# Patient Record
Sex: MALE | Race: WHITE | Employment: OTHER | URBAN - METROPOLITAN AREA
[De-identification: names, ages, dates, MRNs, and addresses within clinical notes are randomized per-mention and may not be internally consistent; named-entity substitution may affect disease eponyms.]

---

## 2017-07-28 ENCOUNTER — HISTORICAL (OUTPATIENT)
Dept: LAB | Facility: HOSPITAL | Age: 67
End: 2017-07-28

## 2017-07-28 LAB
ALBUMIN SERPL-MCNC: 3.7 GM/DL (ref 3.4–5)
ALBUMIN/GLOB SERPL: 1 RATIO
ALP SERPL-CCNC: 105 UNIT/L (ref 30–113)
ALT SERPL-CCNC: 34 UNIT/L (ref 10–45)
AST SERPL-CCNC: 22 UNIT/L (ref 15–37)
BILIRUB SERPL-MCNC: 0.3 MG/DL (ref 0.1–0.9)
BILIRUBIN DIRECT+TOT PNL SERPL-MCNC: 0.1 MG/DL (ref 0–0.3)
BILIRUBIN DIRECT+TOT PNL SERPL-MCNC: 0.2 MG/DL
BUN SERPL-MCNC: 24 MG/DL (ref 10–20)
CALCIUM SERPL-MCNC: 8.8 MG/DL (ref 8–10.5)
CHLORIDE SERPL-SCNC: 106 MMOL/L (ref 100–108)
CHOLEST SERPL-MCNC: 117 MG/DL (ref 140–200)
CHOLEST/HDLC SERPL: <3 MG/DL (ref 35–59)
CO2 SERPL-SCNC: 30 MMOL/L (ref 21–35)
CREAT SERPL-MCNC: 1.08 MG/DL (ref 0.7–1.3)
GLOBULIN SER-MCNC: 3.7 GM/DL
GLUCOSE SERPL-MCNC: 101 MG/DL (ref 75–116)
HDLC SERPL-MCNC: 62 MG/DL (ref 35–59)
LDLC SERPL CALC-MCNC: 50 MG/DL (ref 100–129)
POTASSIUM SERPL-SCNC: 4.9 MMOL/L (ref 3.6–5.2)
PROT SERPL-MCNC: 7.4 GM/DL (ref 6.4–8.2)
SODIUM SERPL-SCNC: 141 MMOL/L (ref 135–145)
TRIGL SERPL-MCNC: 24 MG/DL (ref 35–150)
VLDLC SERPL CALC-MCNC: 5 MG/DL

## 2018-09-21 ENCOUNTER — HISTORICAL (OUTPATIENT)
Dept: LAB | Facility: HOSPITAL | Age: 68
End: 2018-09-21

## 2018-09-21 LAB
ALBUMIN SERPL-MCNC: 3.8 GM/DL (ref 3.4–5)
ALBUMIN/GLOB SERPL: 0.9 RATIO
ALP SERPL-CCNC: 96 UNIT/L (ref 30–113)
ALT SERPL-CCNC: 32 UNIT/L (ref 10–45)
AST SERPL-CCNC: 23 UNIT/L (ref 15–37)
BILIRUB SERPL-MCNC: 0.5 MG/DL (ref 0.1–0.9)
BILIRUBIN DIRECT+TOT PNL SERPL-MCNC: 0.1 MG/DL (ref 0–0.3)
BILIRUBIN DIRECT+TOT PNL SERPL-MCNC: 0.4 MG/DL
BUN SERPL-MCNC: 37 MG/DL (ref 10–20)
CALCIUM SERPL-MCNC: 9.8 MG/DL (ref 8–10.5)
CHLORIDE SERPL-SCNC: 102 MMOL/L (ref 100–108)
CHOLEST SERPL-MCNC: 135 MG/DL (ref 140–200)
CHOLEST/HDLC SERPL: 2 MG/DL (ref 0–8)
CO2 SERPL-SCNC: 26 MMOL/L (ref 21–35)
CREAT SERPL-MCNC: 1.55 MG/DL (ref 0.7–1.3)
GLOBULIN SER-MCNC: 4.2 GM/DL
GLUCOSE SERPL-MCNC: 110 MG/DL (ref 75–116)
HDLC SERPL-MCNC: 64 MG/DL (ref 35–59)
LDLC SERPL CALC-MCNC: 60 MG/DL (ref 100–129)
POTASSIUM SERPL-SCNC: 4.3 MMOL/L (ref 3.6–5.2)
PROT SERPL-MCNC: 8 GM/DL (ref 6.4–8.2)
PSA SERPL-MCNC: 13.23 NG/ML (ref 0–4)
SODIUM SERPL-SCNC: 138 MMOL/L (ref 135–145)
TRIGL SERPL-MCNC: 100 MG/DL (ref 35–150)
VLDLC SERPL CALC-MCNC: 20 MG/DL

## 2018-10-02 ENCOUNTER — HISTORICAL (OUTPATIENT)
Dept: LAB | Facility: HOSPITAL | Age: 68
End: 2018-10-02

## 2018-10-02 LAB — HCV AB SERPL QL IA: NEGATIVE

## 2018-10-08 ENCOUNTER — HISTORICAL (OUTPATIENT)
Dept: SURGERY | Facility: HOSPITAL | Age: 68
End: 2018-10-08

## 2018-10-08 LAB
ABS NEUT (OLG): 4.4 X10(3)/MCL (ref 1.5–6.9)
ERYTHROCYTE [DISTWIDTH] IN BLOOD BY AUTOMATED COUNT: 13.4 % (ref 11.5–17)
HCT VFR BLD AUTO: 41 % (ref 42–52)
HGB BLD-MCNC: 13.5 GM/DL (ref 14–18)
MCH RBC QN AUTO: 31 PG (ref 27–34)
MCHC RBC AUTO-ENTMCNC: 33 GM/DL (ref 31–36)
MCV RBC AUTO: 93 FL (ref 80–99)
PLATELET # BLD AUTO: 231 X10(3)/MCL (ref 140–400)
PMV BLD AUTO: 9.7 FL (ref 6.8–10)
RBC # BLD AUTO: 4.4 X10(6)/MCL (ref 4.7–6.1)
WBC # SPEC AUTO: 7.5 X10(3)/MCL (ref 4.5–11.5)

## 2018-10-09 ENCOUNTER — HISTORICAL (OUTPATIENT)
Dept: ANESTHESIOLOGY | Facility: HOSPITAL | Age: 68
End: 2018-10-09

## 2018-10-12 ENCOUNTER — HISTORICAL (OUTPATIENT)
Dept: RADIOLOGY | Facility: HOSPITAL | Age: 68
End: 2018-10-12

## 2018-11-29 ENCOUNTER — HISTORICAL (OUTPATIENT)
Dept: RADIOLOGY | Facility: HOSPITAL | Age: 68
End: 2018-11-29

## 2019-04-30 ENCOUNTER — HISTORICAL (OUTPATIENT)
Dept: RADIOLOGY | Facility: HOSPITAL | Age: 69
End: 2019-04-30

## 2019-06-04 PROBLEM — G93.89 BRAIN MASS: Status: ACTIVE | Noted: 2019-06-04

## 2019-06-04 PROBLEM — C61 PROSTATE CANCER: Status: ACTIVE | Noted: 2019-06-04

## 2019-08-06 ENCOUNTER — HISTORICAL (OUTPATIENT)
Dept: RADIOLOGY | Facility: HOSPITAL | Age: 69
End: 2019-08-06

## 2019-08-29 ENCOUNTER — HISTORICAL (OUTPATIENT)
Dept: ADMINISTRATIVE | Facility: HOSPITAL | Age: 69
End: 2019-08-29

## 2019-09-17 ENCOUNTER — HISTORICAL (OUTPATIENT)
Dept: ADMINISTRATIVE | Facility: HOSPITAL | Age: 69
End: 2019-09-17

## 2019-09-25 ENCOUNTER — HISTORICAL (OUTPATIENT)
Dept: ADMINISTRATIVE | Facility: HOSPITAL | Age: 69
End: 2019-09-25

## 2019-10-03 ENCOUNTER — HISTORICAL (OUTPATIENT)
Dept: LAB | Facility: HOSPITAL | Age: 69
End: 2019-10-03

## 2019-10-03 LAB
ABS NEUT (OLG): 3.4 X10(3)/MCL (ref 1.5–6.9)
ALBUMIN SERPL-MCNC: 3.6 GM/DL (ref 3.4–5)
ALBUMIN/GLOB SERPL: 1 RATIO
ALP SERPL-CCNC: 102 UNIT/L (ref 30–113)
ALT SERPL-CCNC: 36 UNIT/L (ref 10–45)
AST SERPL-CCNC: 18 UNIT/L (ref 15–37)
BASOPHILS # BLD AUTO: 0 X10(3)/MCL (ref 0–0.1)
BASOPHILS NFR BLD AUTO: 1 % (ref 0–1)
BILIRUB SERPL-MCNC: 0.3 MG/DL (ref 0.1–0.9)
BILIRUBIN DIRECT+TOT PNL SERPL-MCNC: 0.1 MG/DL (ref 0–0.3)
BILIRUBIN DIRECT+TOT PNL SERPL-MCNC: 0.2 MG/DL
BUN SERPL-MCNC: 18 MG/DL (ref 10–20)
CALCIUM SERPL-MCNC: 9.2 MG/DL (ref 8–10.5)
CHLORIDE SERPL-SCNC: 103 MMOL/L (ref 100–108)
CHOLEST SERPL-MCNC: 133 MG/DL (ref 140–200)
CHOLEST/HDLC SERPL: 2 MG/DL (ref 0–8)
CO2 SERPL-SCNC: 29 MMOL/L (ref 21–35)
CREAT SERPL-MCNC: 1.19 MG/DL (ref 0.7–1.3)
EOSINOPHIL # BLD AUTO: 0.2 X10(3)/MCL (ref 0–0.6)
EOSINOPHIL NFR BLD AUTO: 4 % (ref 0–5)
ERYTHROCYTE [DISTWIDTH] IN BLOOD BY AUTOMATED COUNT: 13.8 % (ref 11.5–17)
GLOBULIN SER-MCNC: 3.7 GM/DL
GLUCOSE SERPL-MCNC: 125 MG/DL (ref 75–116)
HCT VFR BLD AUTO: 36.2 % (ref 42–52)
HDLC SERPL-MCNC: 58 MG/DL (ref 35–59)
HGB BLD-MCNC: 11.9 GM/DL (ref 14–18)
IMM GRANULOCYTES # BLD AUTO: 0.01 10*3/UL (ref 0–0.02)
IMM GRANULOCYTES NFR BLD AUTO: 0.2 % (ref 0–0.43)
LDLC SERPL CALC-MCNC: 56 MG/DL (ref 100–129)
LYMPHOCYTES # BLD AUTO: 0.6 X10(3)/MCL (ref 0.5–4.1)
LYMPHOCYTES NFR BLD AUTO: 14 % (ref 15–40)
MCH RBC QN AUTO: 32 PG (ref 27–34)
MCHC RBC AUTO-ENTMCNC: 33 GM/DL (ref 31–36)
MCV RBC AUTO: 97 FL (ref 80–99)
MONOCYTES # BLD AUTO: 0.5 X10(3)/MCL (ref 0–1.1)
MONOCYTES NFR BLD AUTO: 10 % (ref 4–12)
NEUTROPHILS # BLD AUTO: 3.4 X10(3)/MCL (ref 1.5–6.9)
NEUTROPHILS NFR BLD AUTO: 71 % (ref 43–75)
PLATELET # BLD AUTO: 239 X10(3)/MCL (ref 140–400)
PMV BLD AUTO: 8.9 FL (ref 6.8–10)
POTASSIUM SERPL-SCNC: 4.6 MMOL/L (ref 3.6–5.2)
PROT SERPL-MCNC: 7.3 GM/DL (ref 6.4–8.2)
PSA SERPL-MCNC: <0.13 NG/ML (ref 0–4)
RBC # BLD AUTO: 3.72 X10(6)/MCL (ref 4.7–6.1)
SODIUM SERPL-SCNC: 139 MMOL/L (ref 135–145)
TRIGL SERPL-MCNC: 108 MG/DL (ref 35–150)
TSH SERPL-ACNC: 1.44 MIU/ML (ref 0.36–3.74)
VLDLC SERPL CALC-MCNC: 22 MG/DL
WBC # SPEC AUTO: 4.7 X10(3)/MCL (ref 4.5–11.5)

## 2019-10-15 ENCOUNTER — HISTORICAL (OUTPATIENT)
Dept: RADIOLOGY | Facility: HOSPITAL | Age: 69
End: 2019-10-15

## 2019-11-04 ENCOUNTER — HISTORICAL (OUTPATIENT)
Dept: RADIOLOGY | Facility: HOSPITAL | Age: 69
End: 2019-11-04

## 2019-11-04 LAB — POC CREATININE: 1.7 MG/DL (ref 0.6–1.3)

## 2019-11-14 ENCOUNTER — HISTORICAL (OUTPATIENT)
Dept: LAB | Facility: HOSPITAL | Age: 69
End: 2019-11-14

## 2019-11-14 LAB
BUN SERPL-MCNC: 17 MG/DL (ref 10–20)
CALCIUM SERPL-MCNC: 8.6 MG/DL (ref 8–10.5)
CHLORIDE SERPL-SCNC: 106 MMOL/L (ref 100–108)
CO2 SERPL-SCNC: 32 MMOL/L (ref 21–35)
CREAT SERPL-MCNC: 1.14 MG/DL (ref 0.7–1.3)
GLUCOSE SERPL-MCNC: 154 MG/DL (ref 75–116)
POTASSIUM SERPL-SCNC: 4.3 MMOL/L (ref 3.6–5.2)
SODIUM SERPL-SCNC: 142 MMOL/L (ref 135–145)

## 2019-11-27 ENCOUNTER — HISTORICAL (OUTPATIENT)
Dept: RADIOLOGY | Facility: HOSPITAL | Age: 69
End: 2019-11-27

## 2019-11-27 LAB — POC CREATININE: 1.2 MG/DL (ref 0.6–1.3)

## 2019-12-06 ENCOUNTER — HISTORICAL (OUTPATIENT)
Dept: LAB | Facility: HOSPITAL | Age: 69
End: 2019-12-06

## 2019-12-06 LAB
ABS NEUT (OLG): 5.3 X10(3)/MCL (ref 1.5–6.9)
ALBUMIN SERPL-MCNC: 3.8 GM/DL (ref 3.4–5)
ALBUMIN/GLOB SERPL: 1 RATIO
ALP SERPL-CCNC: 125 UNIT/L (ref 30–113)
ALT SERPL-CCNC: 59 UNIT/L (ref 10–45)
AST SERPL-CCNC: 24 UNIT/L (ref 15–37)
BASOPHILS # BLD AUTO: 0 X10(3)/MCL (ref 0–0.1)
BASOPHILS NFR BLD AUTO: 1 % (ref 0–1)
BILIRUB SERPL-MCNC: 0.3 MG/DL (ref 0.1–0.9)
BILIRUBIN DIRECT+TOT PNL SERPL-MCNC: 0.1 MG/DL (ref 0–0.3)
BILIRUBIN DIRECT+TOT PNL SERPL-MCNC: 0.2 MG/DL
BUN SERPL-MCNC: 25 MG/DL (ref 10–20)
CALCIUM SERPL-MCNC: 9.3 MG/DL (ref 8–10.5)
CHLORIDE SERPL-SCNC: 101 MMOL/L (ref 100–108)
CO2 SERPL-SCNC: 29 MMOL/L (ref 21–35)
CREAT SERPL-MCNC: 1.63 MG/DL (ref 0.7–1.3)
EOSINOPHIL # BLD AUTO: 0.2 X10(3)/MCL (ref 0–0.6)
EOSINOPHIL NFR BLD AUTO: 2 % (ref 0–5)
ERYTHROCYTE [DISTWIDTH] IN BLOOD BY AUTOMATED COUNT: 12.8 % (ref 11.5–17)
GLOBULIN SER-MCNC: 4 GM/DL
GLUCOSE SERPL-MCNC: 130 MG/DL (ref 75–116)
HCT VFR BLD AUTO: 43.5 % (ref 42–52)
HGB BLD-MCNC: 14.3 GM/DL (ref 14–18)
IMM GRANULOCYTES # BLD AUTO: 0.03 10*3/UL (ref 0–0.02)
IMM GRANULOCYTES NFR BLD AUTO: 0.4 % (ref 0–0.43)
LYMPHOCYTES # BLD AUTO: 0.8 X10(3)/MCL (ref 0.5–4.1)
LYMPHOCYTES NFR BLD AUTO: 12 % (ref 15–40)
MCH RBC QN AUTO: 31 PG (ref 27–34)
MCHC RBC AUTO-ENTMCNC: 33 GM/DL (ref 31–36)
MCV RBC AUTO: 94 FL (ref 80–99)
MONOCYTES # BLD AUTO: 0.7 X10(3)/MCL (ref 0–1.1)
MONOCYTES NFR BLD AUTO: 10 % (ref 4–12)
NEUTROPHILS # BLD AUTO: 5.3 X10(3)/MCL (ref 1.5–6.9)
NEUTROPHILS NFR BLD AUTO: 75 % (ref 43–75)
PLATELET # BLD AUTO: 271 X10(3)/MCL (ref 140–400)
PMV BLD AUTO: 9.9 FL (ref 6.8–10)
POTASSIUM SERPL-SCNC: 4.5 MMOL/L (ref 3.6–5.2)
PROT SERPL-MCNC: 7.8 GM/DL (ref 6.4–8.2)
RBC # BLD AUTO: 4.64 X10(6)/MCL (ref 4.7–6.1)
SODIUM SERPL-SCNC: 137 MMOL/L (ref 135–145)
TSH SERPL-ACNC: 3.51 MIU/ML (ref 0.36–3.74)
WBC # SPEC AUTO: 7 X10(3)/MCL (ref 4.5–11.5)

## 2019-12-10 ENCOUNTER — HISTORICAL (OUTPATIENT)
Dept: RADIOLOGY | Facility: HOSPITAL | Age: 69
End: 2019-12-10

## 2020-02-06 PROBLEM — Z98.890 S/P CRANIOTOMY: Status: ACTIVE | Noted: 2020-02-06

## 2020-03-04 ENCOUNTER — HISTORICAL (OUTPATIENT)
Dept: LAB | Facility: HOSPITAL | Age: 70
End: 2020-03-04

## 2020-03-04 LAB
ABS NEUT (OLG): 4 X10(3)/MCL (ref 1.5–6.9)
BASOPHILS # BLD AUTO: 0 X10(3)/MCL (ref 0–0.1)
BASOPHILS NFR BLD AUTO: 1 % (ref 0–1)
EOSINOPHIL # BLD AUTO: 0.1 X10(3)/MCL (ref 0–0.6)
EOSINOPHIL NFR BLD AUTO: 2 % (ref 0–5)
ERYTHROCYTE [DISTWIDTH] IN BLOOD BY AUTOMATED COUNT: 13.8 % (ref 11.5–17)
HCT VFR BLD AUTO: 39.8 % (ref 42–52)
HGB BLD-MCNC: 13 GM/DL (ref 14–18)
IMM GRANULOCYTES # BLD AUTO: 0.01 10*3/UL (ref 0–0.02)
IMM GRANULOCYTES NFR BLD AUTO: 0.2 % (ref 0–0.43)
LYMPHOCYTES # BLD AUTO: 0.8 X10(3)/MCL (ref 0.5–4.1)
LYMPHOCYTES NFR BLD AUTO: 14 % (ref 15–40)
MCH RBC QN AUTO: 30 PG (ref 27–34)
MCHC RBC AUTO-ENTMCNC: 33 GM/DL (ref 31–36)
MCV RBC AUTO: 93 FL (ref 80–99)
MONOCYTES # BLD AUTO: 0.7 X10(3)/MCL (ref 0–1.1)
MONOCYTES NFR BLD AUTO: 12 % (ref 4–12)
NEUTROPHILS # BLD AUTO: 4 X10(3)/MCL (ref 1.5–6.9)
NEUTROPHILS NFR BLD AUTO: 72 % (ref 43–75)
PLATELET # BLD AUTO: 259 X10(3)/MCL (ref 140–400)
PMV BLD AUTO: 9.4 FL (ref 6.8–10)
RBC # BLD AUTO: 4.26 X10(6)/MCL (ref 4.7–6.1)
WBC # SPEC AUTO: 5.5 X10(3)/MCL (ref 4.5–11.5)

## 2020-05-05 ENCOUNTER — HISTORICAL (OUTPATIENT)
Dept: RADIOLOGY | Facility: HOSPITAL | Age: 70
End: 2020-05-05

## 2020-05-05 LAB
BUN SERPL-MCNC: 23 MG/DL (ref 7–18)
CALCIUM SERPL-MCNC: 9 MG/DL (ref 8.5–10.1)
CHLORIDE SERPL-SCNC: 108 MMOL/L (ref 98–107)
CO2 SERPL-SCNC: 26 MMOL/L (ref 21–32)
CREAT SERPL-MCNC: 1.2 MG/DL (ref 0.6–1.3)
CREAT/UREA NIT SERPL: 19
GLUCOSE SERPL-MCNC: 140 MG/DL (ref 74–106)
POTASSIUM SERPL-SCNC: 4.1 MMOL/L (ref 3.5–5.1)
SODIUM SERPL-SCNC: 138 MMOL/L (ref 136–145)

## 2020-06-23 ENCOUNTER — HISTORICAL (OUTPATIENT)
Dept: LAB | Facility: HOSPITAL | Age: 70
End: 2020-06-23

## 2020-06-24 ENCOUNTER — HISTORICAL (OUTPATIENT)
Dept: LAB | Facility: HOSPITAL | Age: 70
End: 2020-06-24

## 2020-06-24 LAB
ALBUMIN SERPL-MCNC: 3.4 GM/DL (ref 3.4–4.8)
ALBUMIN/GLOB SERPL: 1 RATIO (ref 1.1–2)
ALP SERPL-CCNC: 112 UNIT/L (ref 40–150)
ALT SERPL-CCNC: 43 UNIT/L (ref 0–55)
AST SERPL-CCNC: 25 UNIT/L (ref 5–34)
BILIRUB SERPL-MCNC: 0.5 MG/DL
BILIRUBIN DIRECT+TOT PNL SERPL-MCNC: 0.2 MG/DL (ref 0–0.5)
BILIRUBIN DIRECT+TOT PNL SERPL-MCNC: 0.3 MG/DL (ref 0–0.8)
BUN SERPL-MCNC: 18 MG/DL (ref 8.4–25.7)
CALCIUM SERPL-MCNC: 9.6 MG/DL (ref 8.8–10)
CHLORIDE SERPL-SCNC: 104 MMOL/L (ref 98–107)
CHOLEST SERPL-MCNC: 174 MG/DL
CHOLEST/HDLC SERPL: 3 {RATIO} (ref 0–5)
CO2 SERPL-SCNC: 28 MMOL/L (ref 23–31)
CREAT SERPL-MCNC: 1.05 MG/DL (ref 0.73–1.18)
GLOBULIN SER-MCNC: 3.5 GM/DL (ref 2.4–3.5)
GLUCOSE SERPL-MCNC: 127 MG/DL (ref 82–115)
HDLC SERPL-MCNC: 50 MG/DL (ref 35–60)
LDLC SERPL CALC-MCNC: 99 MG/DL (ref 50–140)
POTASSIUM SERPL-SCNC: 4.1 MMOL/L (ref 3.5–5.1)
PROT SERPL-MCNC: 6.9 GM/DL (ref 5.8–7.6)
SODIUM SERPL-SCNC: 140 MMOL/L (ref 136–145)
TRIGL SERPL-MCNC: 125 MG/DL (ref 34–140)
TSH SERPL-ACNC: 1.03 UIU/ML (ref 0.35–4.94)
VLDLC SERPL CALC-MCNC: 25 MG/DL

## 2020-08-24 LAB
ABS NEUT (OLG): 3.97 X10(3)/MCL (ref 2.1–9.2)
BASOPHILS # BLD AUTO: 0.03 X10(3)/MCL (ref 0–0.2)
BASOPHILS NFR BLD AUTO: 0.5 % (ref 0–0.9)
BUN SERPL-MCNC: 19 MG/DL (ref 8.4–25.7)
CALCIUM SERPL-MCNC: 9.6 MG/DL (ref 8.8–10)
CHLORIDE SERPL-SCNC: 104 MMOL/L (ref 98–107)
CO2 SERPL-SCNC: 26 MMOL/L (ref 23–31)
CREAT SERPL-MCNC: 1.17 MG/DL (ref 0.72–1.25)
CREAT/UREA NIT SERPL: 16
EOSINOPHIL # BLD AUTO: 0.12 X10(3)/MCL (ref 0–0.9)
EOSINOPHIL NFR BLD AUTO: 2.2 % (ref 0–6.5)
ERYTHROCYTE [DISTWIDTH] IN BLOOD BY AUTOMATED COUNT: 13.6 % (ref 11.5–17)
GLUCOSE SERPL-MCNC: 177 MG/DL (ref 82–115)
HCT VFR BLD AUTO: 38.7 % (ref 42–52)
HGB BLD-MCNC: 13 GM/DL (ref 14–18)
IMM GRANULOCYTES # BLD AUTO: 0.03 10*3/UL (ref 0–0.02)
IMM GRANULOCYTES NFR BLD AUTO: 0.5 % (ref 0–0.43)
LYMPHOCYTES # BLD AUTO: 0.68 X10(3)/MCL (ref 0.6–4.6)
LYMPHOCYTES NFR BLD AUTO: 12.4 % (ref 16.2–38.3)
MCH RBC QN AUTO: 30.8 PG (ref 27–31)
MCHC RBC AUTO-ENTMCNC: 33.6 GM/DL (ref 33–36)
MCV RBC AUTO: 91.7 FL (ref 80–94)
MONOCYTES # BLD AUTO: 0.66 X10(3)/MCL (ref 0.1–1.3)
MONOCYTES NFR BLD AUTO: 12 % (ref 4.7–11.3)
NEUTROPHILS # BLD AUTO: 3.97 X10(3)/MCL (ref 2.1–9.2)
NEUTROPHILS NFR BLD AUTO: 72.4 % (ref 49.1–73.4)
NRBC BLD AUTO-RTO: 0 % (ref 0–0.2)
PLATELET # BLD AUTO: 224 X10(3)/MCL (ref 130–400)
PMV BLD AUTO: 9.8 FL (ref 7.4–10.4)
POTASSIUM SERPL-SCNC: 4.2 MMOL/L (ref 3.5–5.1)
RBC # BLD AUTO: 4.22 X10(6)/MCL (ref 4.7–6.1)
SARS-COV-2 AG RESP QL IA.RAPID: NEGATIVE
SODIUM SERPL-SCNC: 141 MMOL/L (ref 136–145)
WBC # SPEC AUTO: 5.5 X10(3)/MCL (ref 4.5–11.5)

## 2020-08-25 ENCOUNTER — HISTORICAL (OUTPATIENT)
Dept: SURGERY | Facility: HOSPITAL | Age: 70
End: 2020-08-25

## 2020-09-03 ENCOUNTER — HISTORICAL (OUTPATIENT)
Dept: ADMINISTRATIVE | Facility: HOSPITAL | Age: 70
End: 2020-09-03

## 2020-09-04 ENCOUNTER — HISTORICAL (OUTPATIENT)
Dept: SURGERY | Facility: HOSPITAL | Age: 70
End: 2020-09-04

## 2020-09-04 LAB — GRAM STN SPEC: NORMAL

## 2020-09-07 LAB
FINAL CULTURE: NO GROWTH
FINAL CULTURE: NORMAL

## 2020-09-17 ENCOUNTER — HISTORICAL (OUTPATIENT)
Dept: ADMINISTRATIVE | Facility: HOSPITAL | Age: 70
End: 2020-09-17

## 2020-10-05 LAB — FINAL CULTURE: NORMAL

## 2020-10-16 ENCOUNTER — HISTORICAL (OUTPATIENT)
Dept: LAB | Facility: HOSPITAL | Age: 70
End: 2020-10-16

## 2020-10-16 LAB
ALBUMIN SERPL-MCNC: 3.6 GM/DL (ref 3.4–4.8)
ALBUMIN/GLOB SERPL: 0.9 RATIO (ref 1.1–2)
ALP SERPL-CCNC: 125 UNIT/L (ref 40–150)
ALT SERPL-CCNC: 22 UNIT/L (ref 0–55)
AST SERPL-CCNC: 21 UNIT/L (ref 5–34)
BILIRUB SERPL-MCNC: 0.4 MG/DL
BILIRUBIN DIRECT+TOT PNL SERPL-MCNC: 0.2 MG/DL (ref 0–0.5)
BILIRUBIN DIRECT+TOT PNL SERPL-MCNC: 0.2 MG/DL (ref 0–0.8)
BUN SERPL-MCNC: 19 MG/DL (ref 8.4–25.7)
CALCIUM SERPL-MCNC: 9.6 MG/DL (ref 8.8–10)
CHLORIDE SERPL-SCNC: 102 MMOL/L (ref 98–107)
CHOLEST SERPL-MCNC: 127 MG/DL
CHOLEST/HDLC SERPL: 2 {RATIO} (ref 0–5)
CO2 SERPL-SCNC: 29 MMOL/L (ref 23–31)
CREAT SERPL-MCNC: 0.98 MG/DL (ref 0.73–1.18)
GLOBULIN SER-MCNC: 4 GM/DL (ref 2.4–3.5)
GLUCOSE SERPL-MCNC: 123 MG/DL (ref 82–115)
HDLC SERPL-MCNC: 56 MG/DL (ref 35–60)
LDLC SERPL CALC-MCNC: 49 MG/DL (ref 50–140)
POTASSIUM SERPL-SCNC: 4.1 MMOL/L (ref 3.5–5.1)
PROT SERPL-MCNC: 7.6 GM/DL (ref 5.8–7.6)
SODIUM SERPL-SCNC: 142 MMOL/L (ref 136–145)
TRIGL SERPL-MCNC: 108 MG/DL (ref 34–140)
TSH SERPL-ACNC: 0.98 UIU/ML (ref 0.35–4.94)
VLDLC SERPL CALC-MCNC: 22 MG/DL

## 2020-10-19 ENCOUNTER — HISTORICAL (OUTPATIENT)
Dept: ADMINISTRATIVE | Facility: HOSPITAL | Age: 70
End: 2020-10-19

## 2020-11-24 ENCOUNTER — HISTORICAL (OUTPATIENT)
Dept: LAB | Facility: HOSPITAL | Age: 70
End: 2020-11-24

## 2020-11-24 LAB
EST. AVERAGE GLUCOSE BLD GHB EST-MCNC: 128.4 MG/DL
HBA1C MFR BLD: 6.1 %

## 2020-12-11 ENCOUNTER — HISTORICAL (OUTPATIENT)
Dept: RADIOLOGY | Facility: HOSPITAL | Age: 70
End: 2020-12-11

## 2020-12-11 LAB — CREAT SERPL-MCNC: 1.18 MG/DL (ref 0.73–1.18)

## 2021-01-14 ENCOUNTER — HISTORICAL (OUTPATIENT)
Dept: ADMINISTRATIVE | Facility: HOSPITAL | Age: 71
End: 2021-01-14

## 2021-02-26 ENCOUNTER — HISTORICAL (OUTPATIENT)
Dept: LAB | Facility: HOSPITAL | Age: 71
End: 2021-02-26

## 2021-02-26 LAB
PSA SERPL-MCNC: 0.12 NG/ML
TESTOST SERPL-MCNC: 251.18 NG/DL (ref 220.91–715.81)

## 2021-07-02 ENCOUNTER — HISTORICAL (OUTPATIENT)
Dept: ADMINISTRATIVE | Facility: HOSPITAL | Age: 71
End: 2021-07-02

## 2022-02-15 ENCOUNTER — HISTORICAL (OUTPATIENT)
Dept: ADMINISTRATIVE | Facility: HOSPITAL | Age: 72
End: 2022-02-15

## 2022-04-11 ENCOUNTER — HISTORICAL (OUTPATIENT)
Dept: ADMINISTRATIVE | Facility: HOSPITAL | Age: 72
End: 2022-04-11
Payer: MEDICARE

## 2022-04-24 VITALS
DIASTOLIC BLOOD PRESSURE: 73 MMHG | SYSTOLIC BLOOD PRESSURE: 104 MMHG | WEIGHT: 216.06 LBS | BODY MASS INDEX: 32.74 KG/M2 | OXYGEN SATURATION: 95 % | HEIGHT: 68 IN

## 2022-04-30 NOTE — OP NOTE
DATE OF SURGERY:    09/04/2020    SURGEON:  Nick Portillo MD    SERVICE:  Orthopedics    PREOPERATIVE DIAGNOSIS:  Hardware failure, re-fracture, left clavicle fracture; hematoma.    POSTOPERATIVE DIAGNOSIS:  Hardware failure, re-fracture, left clavicle fracture; hematoma.    PROCEDURE:    1. Evacuation of hematoma and washout, left clavicle area.  2. Hardware removal, left clavicle fracture.  3. Open reduction, internal fixation left comminuted left clavicle fracture.    ANESTHESIA:  LMA.    IV FLUIDS:  Per Anesthesia.    ESTIMATED BLOOD LOSS:  Less than 50 cc.    COUNTS:  Correct.    COMPLICATIONS:  None.    IMPLANTS:  Synthes recon plate, 2.4 lag screws.    DISPOSITION:  Stable to PACU.    INDICATIONS FOR PROCEDURE:  Mr. Poe is a 69-year-old male with continued pain, loss of motion of his left shoulder.  Patient had a re-fracture of his left clavicle fracture.  The risks, benefits, and alternatives were discussed with the patient and patient's family in detail.  All questions were answered.  Informed consent was obtained.    PROCEDURE IN DETAIL:  The patient was found in preoperative holding by Anesthesia and found fit for surgery. He was taken to the operating room and placed on the operating table in supine position.  All bony prominences were well padded.  Time-out was called to identify correct patient, correct procedure, and correct site, and all were in agreement.  The patient underwent LMA anesthesia without complications.  He was then prepped and draped in the normal sterile fashion leaving the left upper extremity exposed for surgery.  He was in modified beach chair position.  He received preoperative antibiotics.  Next, his previous incision was then opened.  The hematoma was evacuated.  Cultures were taken.  Copious irrigation was used to wash the wound.  His incision was also brought medially another 3 cm.  Next, with careful and meticulous dissection, the fracture site was debrided of all  soft tissue debris.  It was unable to be lagged primarily due to the severe comminution.  His plate and screws from previous surgery was then removed.  He did have a new fracture line in the most medial fracture of vertical orientation which allowed the screws to cut out.  Next, with the use of point-to-point clamps, the fracture was then anatomically reduced and one 2.4 screw was then lagged across the main fracture fragment fixing the new fracture.  After this was done, an 8-hole recon plate was then chosen.  Three cortical screws were placed medial and lateral to the fracture including a locking screw.  After this was done, this was curved to his clavicle.  After this was done, multiple views with the big C-arm found the fracture well reduced and the hardware in appropriate position.  His fracture was stressed in surgery and found no instability appreciated.  Copious irrigation was used to wash the wound.  Hemostasis was achieved.  The fascia was closed with 0 Vicryl, subcutaneous tissue closed with 2-0 Vicryl.  Skin was closed with 3-0 nylon suture in standard fashion.  Xeroform, 4 x 4's, soft tissue dressing and sling was placed on left upper extremity.  He was awoken by Anesthesia and brought to PACU in stable condition.        ______________________________  MD JUAN JOSE Snyder/MIRIAM  DD:  09/04/2020  Time:  12:39PM  DT:  09/04/2020  Time:  01:01PM  Job #:  [id number]

## 2022-04-30 NOTE — OP NOTE
Patient:   Reggie Poe             MRN: [id number]            FIN: [id number]               Age:   68 years     Sex:  Male     :  1950   Associated Diagnoses:   Encounter for screening colonoscopy; Polyp of colon   Author:   Chris MCCALLUM, Rachael REESE      Operative Note   Operative Information   Date/ Time:  10/9/2018 09:34:00.     Procedures Performed: Procedure Code   Colonoscopy, flexible; with removal of tumor(s), polyp(s), or other lesion(s) by snare technique (37367).  Colonoscopy, flexible; with directed submucosal injection(s), any substance (11484).  Colonoscopy, flexible; with ablation of tumor(s), polyp(s), or other lesion(s) (includes pre- and post-dilation and guide wire passage, when performed) (34250)..     Indications: 68-year-old white male need of first age-appropriate screening colonoscopy.     Preoperative Diagnosis: Encounter for screening colonoscopy ([id number] Z12.11).     Postoperative Diagnosis: Polyp of colon (PST33-XX K63.5).     Surgeon: Chris MCCALLUM, Rachael REESE.     Speciman Removed: 1. Sigmoid polyp at approximately 30 cm  2. Biopsy of large polyp at 25 cm.     Description of Procedure/Findings/    Complications: patient was brought to the endoscopy suite laid in the left lateral decubitus position right side up. Intravenous anesthesia was provided. Digital rectal exam performed exhibiting good anal rectal tone and no masses. An endoscope was then passed through the anus into main the rectum and with gentle insufflation reaching the cecum. Upon reaching the cecum pictures were taken the scope was then slowly withdrawn. Overall the cecum ascending transverse and ascending colon all appeared to be grossly normal. Within the mid sigmoid colon a small sessile polyp was identified. Hot snare polypectomy was then performed successfully at the base. His then sent to pathology. Scope was further withdrawn at approximately 25 cm a secondary larger polyp was identified sessile appearance  initially biopsy performed of it ×2  region was also inked spot for evaluation.  Scope was further withdrawn the remainder of the colon appeared to be grossly normal otherwise. The scope was retroflexed and the distal rectum revealing a normal-appearing perianal mucosa with some small hemorrhoids. Scope was returned to and into position the colon was evacuated of air. Patient was relieved of anesthesia stable condition and transferred to postanesthesia care unit..     Esimated blood loss: loss  1  cc.     Findings: 1. polyp at 30 cm  2. Polyp at 25 cm.     Complications: None.

## 2022-04-30 NOTE — OP NOTE
DATE OF SURGERY:        SURGEON:  Nick Portillo MD    PREOPERATIVE DIAGNOSIS:  Left comminuted displaced midshaft clavicle fracture.    POSTOPERATIVE DIAGNOSIS:  Left comminuted displaced midshaft clavicle fracture.    PROCEDURE:  Open reduction internal fixation left comminuted displaced midshaft clavicle fracture.    ANESTHESIA:  LMA.    IV FLUIDS:  Per Anesthesia.    ESTIMATED BLOOD LOSS:  Less than 50 cc.    COUNTS:  Correct.    COMPLICATIONS:  None.    IMPLANTS:  Synthes 6-hole recon plate, 2.0 lag screws.    DISPOSITION:  Stable to PACU.    INDICATION FOR PROCEDURE:  Mr. Poe is a 69-year-old male with the above findings.  He has been followed in my clinic.  Risks, benefits, and alternatives were discussed with the patient and patient's family in detail.  All questions were answered.  Informed consent was obtained.    PROCEDURE IN DETAIL:  Patient was found in preoperative holding by Anesthesia and found fit for surgery.  He was taken to the operating room and placed on the operating table in supine position.  All bony prominences were well padded.  Timeout was called, identifying correct patient, correct procedure, and correct site, and all were in agreement.  Patient underwent LMA anesthesia without complications.  He was then prepped and draped in normal sterile fashion leaving the left upper extremity exposed for surgery.  He received his preoperative antibiotics.  He was in the modified beach chair position.  Next, with the use of big C-arm, multiple views, correct starting point was then made.  A 9 cm incision was made over the anterior superior aspect of the clavicle fracture.  Soft tissue dissection was carried down, being careful not to damage the neurovascular and tendinous structures.  Next, the medial and distal ends of the fracture site were curetted of all soft tissue debris.  He did have severe comminution with multiple butterfly fragments.  The bigger pieces were anatomically reduced  with point to point clamps and 2.0 lag screws were placed across these attaching them to the more lateral fragment.  After this done, the medial and lateral fragments were then brought together.  Two cortical screws were placed both proximally and distally in a neutralization plate technique, stabilizing his fracture.  He also had 1 additional locking screw placed in the lateral fragment.  After this was done, multiple views with the big C-arm found the fracture well reduced and the hardware in appropriate position.  Hemostasis achieved.  Copious irrigation was used to wash the wound.  The fascia was closed with 0 Vicryl, subcutaneous tissue closed with 2-0 Vicryl, skin was closed with 3-0 nylon suture in standard fashion.  Xeroform, 4 x 4's, soft tissue dressing and sling was placed on the left upper extremity.  He was awoken by Anesthesia and brought to PACU in stable condition.        ______________________________  MD JUAN JOSE Snyder/JONNY  DD:  08/25/2020  Time:  04:13PM  DT:  08/25/2020  Time:  04:28PM  Job #:  [id number]

## 2022-05-04 NOTE — HISTORICAL [facility]
This is a historical note converted from [facility]. Formatting and pictures may have been removed.  Please reference Wilmer for original formatting and attached multimedia. Chief Complaint  7 WEEK F/U [facility] WASHOUT AND FIXATION OF L CLAVICLE, DENIES PAIN, IN SLING  History of Present Illness  Patient returns today for repeat exam. ?Patient states he is doing very well having no pain in his shoulder. ?He is present here with family.? Family states he is doing well, moving his shoulder around, not using his sling.  Physical Exam  Vitals & Measurements  T:?37? ?C (Oral)? HR:?89(Peripheral)? RR:?20? BP:?120/79?  HT:?172.00?cm? WT:?98.000?kg? BMI:?33.13?  Left upper extremity compartments are soft and warm. ?Skin is intact. ?There are no signs or symptoms of DVT or infection. ?His incisions well-healed he is nontender over his fracture site he is able to forward flex and abduct?160 degrees he is stable to stressing he is neurovascular intact distally.? X-rays 2 views of the left clavicle?demonstrates no change in hardware,?suspected early callus formation.  Assessment/Plan  1.?Fracture of left clavicle?S42.002A,?Fracture of left clavicle?S42.002A  ?At this time we discussed his full exam x-ray findings. ?He is healing appropriately, he is asymptomatic. ?We have discussed his hardware again.? We will start physical therapy, low impact activities. ?I would like to see him back in 6 weeks to see how he is progressing.  Ordered:  Clinic Follow up, *Est. 12/17/20 10:00:00 CST, Order for future visit, Fracture of left clavicle, [facility]  Post-Op follow-up visit [id number] PC, Fracture of left clavicle, [facility], 10/19/20 9:45:00 CDT  PT/OT External Referral, 10/19/20 9:48:00 CDT, Fracture of left clavicle, Evaluate and Treat, 3 X Week, Patient has IV, Standard Precautions  XR Clavicle Left, Routine, 10/19/20 9:38:00 CDT, None, Wheelchair, Patient Has IV?, Rad Type, Fracture of left clavicle, Not Scheduled, 10/19/20  9:38:00 CDT  ?  Referrals  Clinic Follow up, *Est. 12/17/20 10:00:00 CST, Order for future visit, Fracture of left clavicle, [facility]  PT/OT External Referral, 10/19/20 9:48:00 CDT, Fracture of left clavicle, Evaluate and Treat, 3 X Week, Patient has IV, Standard Precautions   Problem List/Past Medical History  Ongoing  CAD - Coronary artery disease  Chronic low back pain  Chronic pain  Elevated PSA  History of benign tumor of brain  History of craniotomy  Hypertension  Nocturia  Prostate cancer  Right sided weakness  Sleep apnea  Tremor  Tubular adenoma of colon  Historical  Colon cancer screening  Procedure/Surgical History  Drainage of Left Shoulder Joint, Open Approach (09/04/2020)  Incision and drainage, shoulder area; deep abscess or hematoma (09/04/2020)  Open treatment of clavicular fracture, includes internal fixation, when performed (09/04/2020)  ORIF Clavicle (Left) (09/04/2020)  Removal of implant; deep (eg, buried wire, pin, screw, metal band, nail, fredo or plate) (09/04/2020)  Removal of Internal Fixation Device from Left Shoulder Joint, Open Approach (09/04/2020)  Reposition Left Clavicle with Internal Fixation Device, Open Approach (09/04/2020)  Open treatment of clavicular fracture, includes internal fixation, when performed (08/25/2020)  ORIF Clavicle (Left) (08/25/2020)  Reposition Left Clavicle with Internal Fixation Device, Open Approach (08/25/2020)  Application of finger splint; static (04/26/2020)  Immobilization of Right Finger using Splint (04/26/2020)  Repair Right Hand Skin, External Approach (04/26/2020)  Simple repair of superficial wounds of scalp, neck, axillae, external genitalia, trunk and/or extremities (including hands and feet); 2.6 cm to 7.5 cm (04/26/2020)  craniotomy (06.2019)  Colon Tattoo (10/09/2018)  Colonoscopy (10/09/2018)  Colonoscopy, flexible; with biopsy, single or multiple (10/09/2018)  Colonoscopy, flexible; with directed submucosal injection(s), any substance  (10/09/2018)  Colonoscopy, flexible; with removal of tumor(s), polyp(s), or other lesion(s) by snare technique (10/09/2018)  Excision of Sigmoid Colon, Via Natural or Artificial Opening Endoscopic (10/09/2018)  Excision of Sigmoid Colon, Via Natural or Artificial Opening Endoscopic, Diagnostic (10/09/2018)  Introduction of Other Therapeutic Substance into Lower GI, Via Natural or Artificial Opening Endoscopic (10/09/2018)  Polypectomy (10/09/2018)  Appendectomy (1979)  Colonoscopy, flexible; with ablation of tumor(s), polyp(s), or other lesion(s) (includes pre- and post-dilation and guide wire passage, when performed)  Colonoscopy, flexible; with directed submucosal injection(s), any substance  Colonoscopy, flexible; with removal of tumor(s), polyp(s), or other lesion(s) by snare technique  Joint replacement left hip  Right shoulder repair x 2   Medications  amLODIPine 5 mg oral tablet, 5 mg= 1 tab(s), Oral, Daily  amoxicillin-clavulanate 875 mg-125 mg oral tablet, 1 tab(s), Oral, BID,? ?Not Taking, Completed Rx  atorvastatin 40 mg oral tablet, 40 mg= 1 tab(s), Oral, Daily  busPIRone 10 mg oral tablet, 10 mg= 1 tab(s), Oral, TID  Caltrate 600 + D oral tablet, 1 tab(s), Oral, Daily  carvedilol 12.5 mg oral tablet, 12.5 mg= 1 tab(s), Oral, BID  cephalexin 500 mg oral capsule, 500 mg= 1 cap(s), Oral, BID  cloniDINE 0.1 mg oral tablet, 0.1 mg= 1 tab(s), Oral, qPM,? ?Still taking, not as prescribed: as needed, Last Dose Date/Time Unknown  Decadron 4 mg oral tablet, 4 mg= 1 tab(s), Oral, BID,? ?Not taking  Fish Oil oral capsule, 1 cap(s), Oral, Daily  fluoxetine 20 mg oral capsule, 2, Oral, Daily  furosemide 20 mg oral tablet, 20 mg= 1 tab(s), Oral, Daily,? ?Still taking, not as prescribed: takes three times weekly,  lactulose 10 g/15 mL oral and rectal liquid, 20 gm= 30 mL, Oral, TID,? ?Still taking, not as prescribed: every pm,  lactulose 10 g/15 mL oral syrup, 10 gm= 15 mL, Oral, Daily,? ?Not Taking, Completed  Rx  meloxicam 15 mg oral tablet, 15 mg= 1 tab(s), Oral, Daily,? ?Not taking  methylPREDNISolone 4 mg oral tab,? ?Not Taking, Completed Rx  One A Day Men 50 Plus oral tablet, 1 tab, Daily  Pantoprazole 40 mg ORAL EC-Tablet, 40 mg= 1 tab(s), Oral, Daily  Percocet 5 mg-325 mg oral tablet, 1 tab(s), Oral, q6hr, PRN,? ?Not Taking, Completed Rx  Percocet 5/325 oral tablet, 1 tab(s), Oral, q6hr, PRN,? ?Not taking  Percocet 5/325 oral tablet, 1 tab(s), Oral, q6hr, PRN,? ?Not taking  Percocet 5/325 oral tablet, 1 tab(s), Oral, q6hr, PRN  Super B Complex oral tablet, 1 tab(s), Oral, Daily  tamsulosin 0.4 mg oral capsule, 0.4 mg= 1 cap(s), Oral, Daily  venlafaxine 150 mg oral capsule, extended release, 150 mg= 1 cap(s), Oral, Daily,? ?Not taking  zaleplon 10 mg oral capsule, 10 mg= 1 cap(s), Oral, qPM  zaleplon 5 mg oral capsule, 5 mg= 1 cap(s), Oral, qPM  Zestril 20 mg oral tablet, 20 mg= 1 tab(s), Oral, BID  ziprasidone 20 mg oral capsule, 20 mg= 1 cap(s), Oral, BID,? 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 CPAP/BiPAP, Ventilator, 08/24/2020  Lives with Spouse. CPAP/BiPAP, Walker/Cane, 08/24/2020  Lives with Spouse. Living situation: Home/Independent. Home equipment: CPAP/BiPAP. Alcohol abuse in household: No. Substance abuse in household: No. Smoker in household: No. Injuries/Abuse/Neglect in household: No. Feels unsafe at home: No. Safe place to go: Yes. Agency(s)/Others notified: No. Family/Friends available for support: Yes. Concern for family members at home: No. Major illness in household: No. Financial concerns: No.  TV/Computer concerns: No., 10/08/2018  Nutrition/Health  Regular, 08/24/2020  Regular, 08/24/2020  Regular, 10/02/2018  Other  Sexual  Substance Use  Never, 08/24/2020  Never, 08/24/2020  Never, 10/02/2018  Tobacco  Never (less than 100 in lifetime), N/A, 09/17/2020  Family History  Family history is negative  Immunizations  Vaccine Date Status   influenza virus vaccine, inactivated 10/02/2018 Recorded   pneumococcal vacc 07/21/2015 Recorded   Health Maintenance  Health Maintenance  ???Pending?(in the next year)  ??? ??OverDue  ??? ? ? ?Influenza Vaccine due??10/01/19??and every 1??day(s)  ??? ? ? ?Depression Screening due??10/15/19??and every 1??year(s)  ??? ? ? ?Alcohol Misuse Screening due??01/02/20??and every 1??year(s)  ??? ? ? ?Cognitive Screening due??01/02/20??and every 1??year(s)  ??? ??Due?  ??? ? ? ?ADL Screening due??10/19/20??and every 1??year(s)  ??? ? ? ?Aspirin Therapy for CVD Prevention due??10/19/20??and every 1??year(s)  ??? ? ? ?Coronary Artery Disease Maintenance-Antiplatelet Agent Prescribed due??10/19/20??and every?  ??? ? ? ?Hypertension Management-Education due??10/19/20??and every 1??year(s)  ??? ? ? ?Medicare Annual Wellness Exam due??10/19/20??and every 1??year(s)  ??? ? ? ?Pneumococcal Vaccine due??10/19/20??and every?  ??? ? ? ?Tetanus Vaccine due??10/19/20??and every 10??year(s)  ??? ? ? ?Zoster Vaccine due??10/19/20??and every?  ??? ??Due In Future?  ??? ? ? ?Obesity Screening not due until??01/01/21??and every 1??year(s)  ??? ? ? ?Advance Directive not due until??01/02/21??and every 1??year(s)  ??? ? ? ?Fall Risk Assessment not due until??01/02/21??and every 1??year(s)  ??? ? ? ?Functional Assessment not due until??01/02/21??and every 1??year(s)  ??? ? ? ?Coronary Artery Disease Maintenance-Electrocardiogram not due until??08/25/21??and every 1??year(s)  ??? ? ? ?Hypertension Management-BMP not due until??10/16/21??and every 1??year(s)  ??? ? ? ?Coronary Artery Disease  Maintenance-BMP not due until??10/16/21??and every 1??year(s)  ???Satisfied?(in the past 1 year)  ??? ??Satisfied?  ??? ? ? ?Advance Directive on??09/04/20.??Satisfied by Dayanna Wisdom RN.  ??? ? ? ?Blood Pressure Screening on??10/19/20.??Satisfied by Aziza Cheema  ??? ? ? ?Body Mass Index Check on??10/19/20.??Satisfied by Aziza Cheema  ??? ? ? ?Coronary Artery Disease Maintenance-BMP on??10/16/20.??Satisfied by Sophia Alcazar  ??? ? ? ?Diabetes Screening on??10/16/20.??Satisfied by Sophia Alcazar  ??? ? ? ?Fall Risk Assessment on??10/19/20.??Satisfied by Aziza Cheema  ??? ? ? ?Functional Assessment on??09/04/20.??Satisfied by Dayanna Wisdom RN.  ??? ? ? ?Hypertension Management-Blood Pressure on??10/19/20.??Satisfied by Aziza Cheema  ??? ? ? ?Lipid Screening on??10/16/20.??Satisfied by Sophia Alcazar  ??? ? ? ?Obesity Screening on??10/19/20.??Satisfied by Aziza Cheema  ?

## 2022-05-04 NOTE — HISTORICAL [facility]
This is a historical note converted from Wilmer. Formatting and pictures may have been removed.  Please reference Wilmer for original formatting and attached multimedia. TAKE THE FOLLOWING MEDICATIONS MORNING OF SURGERY:  ?   bp meds  ?   DO NOT take the following medications morning of surgery:  ?   [ ]  ?  STOP the following medications by:  ?  [ ]  ?Medication List  ?? Active Medications  ?? ? ??Prescribed  ?? ? ? ? ? acetaminophen-oxyCODONE: 1 tab(s), Oral, q6hr, PRN: for pain, 24  ?? ? ? ? ? ? tab(s), 0 Refill(s).  ?? ? ? ? ? acetaminophen-oxyCODONE: 1 tab(s), Oral, q6hr, PRN: pain, 20 tab(s),  ?? ? ? ? ? ? 0 Refill(s).  ?? ? ? ? ? acetaminophen-oxyCODONE: 1 tab(s), Oral, q6hr, PRN: pain, 28 tab(s),  ?? ? ? ? ? ? 0 Refill(s).  ?? ? ? ? ? cephalexin: 500 mg, 1 cap(s), Oral, BID, 1 po bid for 7 days, 14  ?? ? ? ? ? ? cap(s), 0 Refill(s).  ?? ? ? ? ? cephalexin: 500 mg, 1 cap(s), Oral, BID, 1 po bid for 7 days, 14  ?? ? ? ? ? ? cap(s), 0 Refill(s).  ?? ? ??Documented  ?? ? ? ? ? amLODIPine: 5 mg, 1 tab(s), Oral, Daily.  ?? ? ? ? ? atorvastatin: 40 mg, 1 tab(s), Oral, Daily.  ?? ? ? ? ? busPIRone: 10 mg, 1 tab(s), Oral, TID.  ?? ? ? ? ? calcium-vitamin D: 1 tab(s), Oral, Daily, 60 tab(s), 0 Refill(s).  ?? ? ? ? ? carvedilol: 12.5 mg, 1 tab(s), Oral, BID.  ?? ? ? ? ? cloNIDine: 0.1 mg, 1 tab(s), Oral, qPM.  ?? ? ? ? ? FLUoxetine: 2, Oral, Daily.  ?? ? ? ? ? furosemide: 20 mg, 1 tab(s), Oral, Daily.  ?? ? ? ? ? lactulose: 20 gm, 30 mL, Oral, TID.  ?? ? ? ? ? lactulose: 10 gm, 15 mL, Oral, Daily.  ?? ? ? ? ? lisinopril: 20 mg, 1 tab(s), Oral, BID.  ?? ? ? ? ? methylPREDNISolone: .  ?? ? ? ? ? multivitamin: 1 tab(s), Oral, Daily, 30 tab(s), 0 Refill(s).  ?? ? ? ? ? multivitamin with minerals: 1 tab, Daily, 0 Refill(s).  ?? ? ? ? ? omega-3 polyunsaturated fatty acids: 1 cap(s), Oral, Daily, 0  ?? ? ? ? ? ? Refill(s).  ?? ? ? ? ? pantoprazole: 40 mg, 1 tab(s), Oral, Daily.  ?? ? ? ? ? tamsulosin: 0.4 mg, 1 cap(s), Oral,  Daily.  ?? ? ? ? ? venlafaxine: 150 mg, 1 cap(s), Oral, Daily.  ?? ? ? ? ? zaleplon: 5 mg, 1 cap(s), Oral, qPM.  ?? ? ? ? ? ziprasidone: 20 mg, 1 cap(s), Oral, BID.  ?? Medications Inactivated in the Last 72 Hours  ?? ? ? No medications found.

## 2022-05-04 NOTE — HISTORICAL [facility]ER
This is a historical note converted from [place]. Formatting and pictures may have been removed.  Please reference [place] for original formatting and attached multimedia. Chief Complaint  left clavicle fx sx 8.25.2020 ?drainage, warm to touch, reddness.started antibiotic yesterday at noon.  History of Present Illness  Mr. Poe presents today preoperative evaluation for hardware removal left clavicle fracture, possible ORIF, evacuation of hematoma?left clavicle area. I reviewed the indications for surgery. The risks and benefits of the proposed and alternative treatments were discussed with the patient. Questions pertinent to the procedure were solicited and answered. No assurances were given. Informed consent was obtained. The patient expressed good understanding and wished to proceed with scheduling the procedure.  Review of Systems  Constitutional: No fever, weakness, or fatigue.  Ear/Nose/Mouth/Throat: No nasal congestion or sore throat.  Respiratory: No shortness of breath or cough.  Cardiovascular: No chest pain, palpitations, or peripheral edema.  Gastrointestinal: No nausea, vomiting, or abdominal pain.  Genitourinary: No dysuria.  Musculoskeletal:?Left shoulder pain swelling loss of motion  Integumentary: Negative.  Physical Exam  Vitals & Measurements  T:?98.1? ?F (Oral)? HR:?94(Peripheral)? BP:?115/57? WT:?98.000?kg? BMI:?33.13?  Appearance: No distress, good color on room air. Alert and cooperative.  HEENT: Normocephalic. PERRLA EOM intact. Nose and throat clear.  Lungs: Clear to auscultation and percussion.  Heart: Regular rate and rhythm without murmurs or gallops.  Abdomen: Soft non tender. Bowel sounds positive. No rebound tenderness.  Extremities:?Left upper extremity compartment soft and warm. ?Skin is intact. ?His incision is healing, he has some mild drainage anteriorly.? He does have some prominent hardware.? He is appropriately tender in this area is nontender elsewhere he is neurovascular tact  distally.  Skin: No rashes or open wounds.  Neuro: No focal weakness, loss of sensation or incoordination. Gait and station are normal.  DTRs equal.  Other:?X-rays 2 views of the left clavicle demonstrates a displaced clavicle fracture with hardware failure  Assessment/Plan  1.?Fracture of left clavicle?S42.002A  Plan for hardware removal left clavicle fracture evacuation of hematoma possible ORIF left clavicle fracture at [facility]. The patient has been given preoperative instructions and prescriptions for post-operative medication. Post-operative appointment is scheduled for 2 weeks.  Ordered:  Post-Op follow-up visit [id number] PC, Fracture of left clavicle  Orthopedic hardware present, [facility], 09/03/20 13:57:00 CDT  ?  2.?Orthopedic hardware present?Z97.8  Ordered:  Post-Op follow-up visit [id number] PC, Fracture of left clavicle  Orthopedic hardware present, [facility], 09/03/20 13:57:00 CDT  ?  Orders:  XR Clavicle Left, Routine, 09/03/20 13:39:00 CDT, None, Wheelchair, Patient Has IV?, Rad Type, Clavicle fracture, Not Scheduled, 09/03/20 13:39:00 CDT   Problem List/Past Medical History  Ongoing  CAD - Coronary artery disease  Chronic low back pain  Chronic pain  Elevated PSA  History of benign tumor of brain  History of craniotomy  Hypertension  Nocturia  Prostate cancer  Right sided weakness  Sleep apnea  Tremor  Tubular adenoma of colon  Historical  Colon cancer screening  Procedure/Surgical History  Open treatment of clavicular fracture, includes internal fixation, when performed (08/25/2020)  ORIF Clavicle (Left) (08/25/2020)  Reposition Left Clavicle with Internal Fixation Device, Open Approach (08/25/2020)  Application of finger splint; static (04/26/2020)  Immobilization of Right Finger using Splint (04/26/2020)  Repair Right Hand Skin, External Approach (04/26/2020)  Simple repair of superficial wounds of scalp, neck, axillae, external genitalia, trunk and/or extremities (including hands  and feet); 2.6 cm to 7.5 cm (04/26/2020)  craniotomy (06.2019)  Colon Tattoo (10/09/2018)  Colonoscopy (10/09/2018)  Colonoscopy, flexible; with biopsy, single or multiple (10/09/2018)  Colonoscopy, flexible; with directed submucosal injection(s), any substance (10/09/2018)  Colonoscopy, flexible; with removal of tumor(s), polyp(s), or other lesion(s) by snare technique (10/09/2018)  Excision of Sigmoid Colon, Via Natural or Artificial Opening Endoscopic (10/09/2018)  Excision of Sigmoid Colon, Via Natural or Artificial Opening Endoscopic, Diagnostic (10/09/2018)  Introduction of Other Therapeutic Substance into Lower GI, Via Natural or Artificial Opening Endoscopic (10/09/2018)  Polypectomy (10/09/2018)  Appendectomy (1979)  Colonoscopy, flexible; with ablation of tumor(s), polyp(s), or other lesion(s) (includes pre- and post-dilation and guide wire passage, when performed)  Colonoscopy, flexible; with directed submucosal injection(s), any substance  Colonoscopy, flexible; with removal of tumor(s), polyp(s), or other lesion(s) by snare technique  Joint replacement left hip  Right shoulder repair x 2   Medications  Inpatient  No active inpatient medications  Home  amLODIPine 5 mg oral tablet, 5 mg= 1 tab(s), Oral, Daily  atorvastatin 40 mg oral tablet, 40 mg= 1 tab(s), Oral, Daily  busPIRone 10 mg oral tablet, 10 mg= 1 tab(s), Oral, TID  Caltrate 600 + D oral tablet, 1 tab(s), Oral, Daily  carvedilol 12.5 mg oral tablet, 12.5 mg= 1 tab(s), Oral, BID  cloniDINE 0.1 mg oral tablet, 0.1 mg= 1 tab(s), Oral, qPM,? ?Still taking, not as prescribed: as needed,  Fish Oil oral capsule, 1 cap(s), Oral, Daily  fluoxetine 20 mg oral capsule, 2, Oral, Daily  furosemide 20 mg oral tablet, 20 mg= 1 tab(s), Oral, Daily,? ?Still taking, not as prescribed: takes three times weekly,  Keflex 500 mg oral capsule, 500 mg= 1 cap(s), Oral, BID  Keflex 500 mg oral capsule, 500 mg= 1 cap(s), Oral, BID  lactulose 10 g/15 mL oral and rectal  liquid, 20 gm= 30 mL, Oral, TID,? ?Still taking, not as prescribed: every pm,  lactulose 10 g/15 mL oral syrup, 10 gm= 15 mL, Oral, Daily,? ?Not Taking, Completed Rx  methylPREDNISolone 4 mg oral tab,? ?Not Taking, Completed Rx  One A Day Men 50 Plus oral tablet, 1 tab, Daily  Pantoprazole 40 mg ORAL EC-Tablet, 40 mg= 1 tab(s), Oral, Daily  Percocet 5/325 oral tablet, 1 tab(s), Oral, q6hr, PRN  Percocet 5/325 oral tablet, 1 tab(s), Oral, q6hr, PRN  Percocet 5/325 oral tablet, 1 tab(s), Oral, q6hr, PRN  Super B Complex oral tablet, 1 tab(s), Oral, Daily  tamsulosin 0.4 mg oral capsule, 0.4 mg= 1 cap(s), Oral, Daily  venlafaxine 150 mg oral capsule, extended release, 150 mg= 1 cap(s), Oral, Daily  zaleplon 5 mg oral capsule, 5 mg= 1 cap(s), Oral, qPM  Zestril 20 mg oral tablet, 20 mg= 1 tab(s), Oral, BID  ziprasidone 20 mg oral capsule, 20 mg= 1 cap(s), Oral, BID,? ?Still taking, not as prescribed: takes every pm,  Allergies  No Known Allergies  No Known Medication Allergies  Social History  Abuse/Neglect  No, No, Yes, 09/03/2020  No, 08/24/2020  Alcohol  Past, 08/24/2020  Past, Beer, 08/24/2020  Current, Beer, 10/02/2018  Employment/School  Retired, 08/24/2020  Retired, 08/24/2020  Retired, Work/School description: Farmer. Highest education level: Some college., 10/08/2018  Exercise  Home/Environment  Lives with Spouse. CPAP/BiPAP, Ventilator, 08/24/2020  Lives with Spouse. CPAP/BiPAP, Walker/Cane, 08/24/2020  Lives with Spouse. Living situation: Home/Independent. Home equipment: CPAP/BiPAP. Alcohol abuse in household: No. Substance abuse in household: No. Smoker in household: No. Injuries/Abuse/Neglect in household: No. Feels unsafe at home: No. Safe place to go: Yes. Agency(s)/Others notified: No. Family/Friends available for support: Yes. Concern for family members at home: No. Major illness in household: No. Financial concerns: No. TV/Computer concerns: No., 10/08/2018  Nutrition/Health  Regular,  08/24/2020  Regular, 08/24/2020  Regular, 10/02/2018  Other  Sexual  Substance Use  Never, 08/24/2020  Never, 08/24/2020  Never, 10/02/2018  Tobacco  Former smoker, quit more than 30 days ago, N/A, 09/03/2020  Former smoker, quit more than 30 days ago, Cigarettes, N/A, 08/24/2020  Former smoker, quit more than 30 days ago, Cigarettes, N/A, 08/24/2020  Immunizations  Vaccine Date Status   influenza virus vaccine, inactivated 10/02/2018 Recorded   pneumococcal vacc 07/21/2015 Recorded

## 2022-05-04 NOTE — HISTORICAL [facility]
This is a historical note converted from [place]. Formatting and pictures may have been removed.  Please reference [place] for original formatting and attached multimedia. Chief Complaint  2 WEEK F/U ?ORIF L CLAVICLE W/HWR, AND WASHOUT OF HEMATOMA, IN SLING, DENIES PAIN, HERE FOR WOUND CHECK  History of Present Illness  Patient returns today for his follow-up of his 2 weeks?of hardware removal washout and fixation of his clavicle fracture. ?Patient has been in and out of his sling family states he is doing much better he denies any pain or discomfort?he denies any drainage he is not take any pain?medications for his shoulder.  Physical Exam  Vitals & Measurements  T:?37? ?C (Oral)? HR:?89(Peripheral)? RR:?20? BP:?120/79?  HT:?172.00?cm? WT:?98.000?kg? BMI:?33.13?  Left upper extremity form soft and warm. ?Skin is intact. ?There is no signs or symptoms of DVT or infection. ?His incision is well-healed?his sutures have been removed there is no swelling there is no erythema he is nontender over his fracture site?there is no skin tenting he is neurovascular tact distally. ?X-rays 2 views left shoulder?demonstrates compression of his clavicle fracture, hardware on bone.  Assessment/Plan  1.?Fracture of left clavicle?S42.002A  ?At this time we discussed his physical exam and x-ray findings. ?We will continue his postoperative care he will remain nonweightbearing he refrain from any heavy lifting?we have discussed his sling. ?We have?discussed pendulum exercises.? He is doing much better. ?I would like to see him back in 4 weeks to see how he is progressing.  Ordered:  Clinic Follow up, *Est. 10/15/20 10:15:00 CDT, Order for future visit, Fracture of left clavicle, Orthopaedics  Post-Op follow-up visit [id number] PC, Fracture of left clavicle, [facility], 09/17/20 11:22:00 CDT  PT/OT External Referral, 09/17/20 11:22:00 CDT, Fracture of left clavicle, Evaluate and Treat, 3 X Week, Patient has IV, Standard  Precautions, nwb to LUE, continue sling, ok for pendelum exercises  ?  Referrals  Clinic Follow up, *Est. 10/15/20 10:15:00 CDT, Order for future visit, Fracture of left clavicle, [facility]  PT/OT External Referral, 09/17/20 11:22:00 CDT, Fracture of left clavicle, Evaluate and Treat, 3 X Week, Patient has IV, Standard Precautions, nwb to LUE, continue sling, ok for pendelum exercises   Problem List/Past Medical History  Ongoing  CAD - Coronary artery disease  Chronic low back pain  Chronic pain  Elevated PSA  History of benign tumor of brain  History of craniotomy  Hypertension  Nocturia  Prostate cancer  Right sided weakness  Sleep apnea  Tremor  Tubular adenoma of colon  Historical  Colon cancer screening  Procedure/Surgical History  Drainage of Left Shoulder Joint, Open Approach (09/04/2020)  Incision and drainage, shoulder area; deep abscess or hematoma (09/04/2020)  Open treatment of clavicular fracture, includes internal fixation, when performed (09/04/2020)  ORIF Clavicle (Left) (09/04/2020)  Removal of implant; deep (eg, buried wire, pin, screw, metal band, nail, fredo or plate) (09/04/2020)  Removal of Internal Fixation Device from Left Shoulder Joint, Open Approach (09/04/2020)  Reposition Left Clavicle with Internal Fixation Device, Open Approach (09/04/2020)  Open treatment of clavicular fracture, includes internal fixation, when performed (08/25/2020)  ORIF Clavicle (Left) (08/25/2020)  Reposition Left Clavicle with Internal Fixation Device, Open Approach (08/25/2020)  Application of finger splint; static (04/26/2020)  Immobilization of Right Finger using Splint (04/26/2020)  Repair Right Hand Skin, External Approach (04/26/2020)  Simple repair of superficial wounds of scalp, neck, axillae, external genitalia, trunk and/or extremities (including hands and feet); 2.6 cm to 7.5 cm (04/26/2020)  craniotomy (06.2019)  Colon Tattoo (10/09/2018)  Colonoscopy (10/09/2018)  Colonoscopy, flexible; with  biopsy, single or multiple (10/09/2018)  Colonoscopy, flexible; with directed submucosal injection(s), any substance (10/09/2018)  Colonoscopy, flexible; with removal of tumor(s), polyp(s), or other lesion(s) by snare technique (10/09/2018)  Excision of Sigmoid Colon, Via Natural or Artificial Opening Endoscopic (10/09/2018)  Excision of Sigmoid Colon, Via Natural or Artificial Opening Endoscopic, Diagnostic (10/09/2018)  Introduction of Other Therapeutic Substance into Lower GI, Via Natural or Artificial Opening Endoscopic (10/09/2018)  Polypectomy (10/09/2018)  Appendectomy (1979)  Colonoscopy, flexible; with ablation of tumor(s), polyp(s), or other lesion(s) (includes pre- and post-dilation and guide wire passage, when performed)  Colonoscopy, flexible; with directed submucosal injection(s), any substance  Colonoscopy, flexible; with removal of tumor(s), polyp(s), or other lesion(s) by snare technique  Joint replacement left hip  Right shoulder repair x 2   Medications  amLODIPine 5 mg oral tablet, 5 mg= 1 tab(s), Oral, Daily  amoxicillin-clavulanate 875 mg-125 mg oral tablet, 1 tab(s), Oral, BID,? ?Not Taking, Completed Rx  atorvastatin 40 mg oral tablet, 40 mg= 1 tab(s), Oral, Daily  busPIRone 10 mg oral tablet, 10 mg= 1 tab(s), Oral, TID  Caltrate 600 + D oral tablet, 1 tab(s), Oral, Daily  carvedilol 12.5 mg oral tablet, 12.5 mg= 1 tab(s), Oral, BID  cloniDINE 0.1 mg oral tablet, 0.1 mg= 1 tab(s), Oral, qPM,? ?Still taking, not as prescribed: as needed, Last Dose Date/Time Unknown  Decadron 4 mg oral tablet, 4 mg= 1 tab(s), Oral, BID,? ?Not taking  Fish Oil oral capsule, 1 cap(s), Oral, Daily  fluoxetine 20 mg oral capsule, 2, Oral, Daily  furosemide 20 mg oral tablet, 20 mg= 1 tab(s), Oral, Daily,? ?Still taking, not as prescribed: takes three times weekly,  lactulose 10 g/15 mL oral and rectal liquid, 20 gm= 30 mL, Oral, TID,? ?Still taking, not as prescribed: every pm,  lactulose 10 g/15 mL oral syrup, 10  gm= 15 mL, Oral, Daily,? ?Not Taking, Completed Rx  meloxicam 15 mg oral tablet, 15 mg= 1 tab(s), Oral, Daily,? ?Not taking  methylPREDNISolone 4 mg oral tab,? ?Not Taking, Completed Rx  One A Day Men 50 Plus oral tablet, 1 tab, Daily  Pantoprazole 40 mg ORAL EC-Tablet, 40 mg= 1 tab(s), Oral, Daily  Percocet 5 mg-325 mg oral tablet, 1 tab(s), Oral, q6hr, PRN,? ?Not Taking, Completed Rx  Percocet 5/325 oral tablet, 1 tab(s), Oral, q6hr, PRN,? ?Not taking  Percocet 5/325 oral tablet, 1 tab(s), Oral, q6hr, PRN,? ?Not taking  Percocet 5/325 oral tablet, 1 tab(s), Oral, q6hr, PRN  Super B Complex oral tablet, 1 tab(s), Oral, Daily  tamsulosin 0.4 mg oral capsule, 0.4 mg= 1 cap(s), Oral, Daily  venlafaxine 150 mg oral capsule, extended release, 150 mg= 1 cap(s), Oral, Daily,? ?Not taking  zaleplon 5 mg oral capsule, 5 mg= 1 cap(s), Oral, qPM  Zestril 20 mg oral tablet, 20 mg= 1 tab(s), Oral, BID  ziprasidone 20 mg oral capsule, 20 mg= 1 cap(s), Oral, BID,? 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 CPAP/BiPAP, Ventilator, 08/24/2020  Lives with Spouse. CPAP/BiPAP, Walker/Cane, 08/24/2020  Lives with Spouse. Living situation: Home/Independent. Home equipment: CPAP/BiPAP. Alcohol abuse in household: No. Substance abuse in household: No. Smoker in household: No. Injuries/Abuse/Neglect in household: No. Feels unsafe at home: No. Safe place to go: Yes. Agency(s)/Others notified: No. Family/Friends available for support: Yes. Concern for family members at home: No. Major illness in household: No. Financial concerns: No. TV/Computer  concerns: No., 10/08/2018  Nutrition/Health  Regular, 08/24/2020  Regular, 08/24/2020  Regular, 10/02/2018  Other  Sexual  Substance Use  Never, 08/24/2020  Never, 08/24/2020  Never, 10/02/2018  Tobacco  Never (less than 100 in lifetime), N/A, 09/17/2020  Family History  Family history is negative  Immunizations  Vaccine Date Status   influenza virus vaccine, inactivated 10/02/2018 Recorded   pneumococcal vacc 07/21/2015 Recorded   Health Maintenance  Health Maintenance  ???Pending?(in the next year)  ??? ??OverDue  ??? ? ? ?Depression Screening due??10/15/19??and every 1??year(s)  ??? ? ? ?Alcohol Misuse Screening due??01/02/20??and every 1??year(s)  ??? ? ? ?Cognitive Screening due??01/02/20??and every 1??year(s)  ??? ??Due?  ??? ? ? ?ADL Screening due??09/17/20??and every 1??year(s)  ??? ? ? ?Aspirin Therapy for CVD Prevention due??09/17/20??and every 1??year(s)  ??? ? ? ?Coronary Artery Disease Maintenance-Antiplatelet Agent Prescribed due??09/17/20??and every?  ??? ? ? ?Hypertension Management-Education due??09/17/20??and every 1??year(s)  ??? ? ? ?Influenza Vaccine due??09/17/20??and every?  ??? ? ? ?Medicare Annual Wellness Exam due??09/17/20??and every 1??year(s)  ??? ? ? ?Pneumococcal Vaccine due??09/17/20??and every?  ??? ? ? ?Tetanus Vaccine due??09/17/20??and every 10??year(s)  ??? ? ? ?Zoster Vaccine due??09/17/20??and every?  ??? ??Due In Future?  ??? ? ? ?Obesity Screening not due until??01/01/21??and every 1??year(s)  ??? ? ? ?Advance Directive not due until??01/02/21??and every 1??year(s)  ??? ? ? ?Fall Risk Assessment not due until??01/02/21??and every 1??year(s)  ??? ? ? ?Functional Assessment not due until??01/02/21??and every 1??year(s)  ??? ? ? ?Hypertension Management-BMP not due until??08/24/21??and every 1??year(s)  ??? ? ? ?Coronary Artery Disease Maintenance-BMP not due until??08/24/21??and every 1??year(s)  ??? ? ? ?Coronary Artery Disease Maintenance-Electrocardiogram not due  until??08/25/21??and every 1??year(s)  ???Satisfied?(in the past 1 year)  ??? ??Satisfied?  ??? ? ? ?Advance Directive on??09/04/20.??Satisfied by Dayanna Wisdom RN.  ??? ? ? ?Blood Pressure Screening on??09/17/20.??Satisfied by Aziza Cheema  ??? ? ? ?Body Mass Index Check on??09/17/20.??Satisfied by Aziza Cheema  ??? ? ? ?Coronary Artery Disease Maintenance-Electrocardiogram on??08/25/20.  ??? ? ? ?Diabetes Screening on??08/24/20.??Satisfied by Daniele Brown  ??? ? ? ?Fall Risk Assessment on??09/17/20.??Satisfied by Aziza Cheema  ??? ? ? ?Functional Assessment on??09/04/20.??Satisfied by Dayanna Wisdom RN.  ??? ? ? ?Hypertension Management-Blood Pressure on??09/17/20.??Satisfied by Aziza Cheema  ??? ? ? ?Lipid Screening on??06/24/20.??Satisfied by Elisabeth Jerez  ??? ? ? ?Obesity Screening on??09/17/20.??Satisfied by Aziza Cheema  ?

## 2022-07-27 DIAGNOSIS — C71.6 MALIGNANT NEOPLASM OF CEREBELLOPONTINE ANGLE: Primary | ICD-10-CM

## 2022-08-03 ENCOUNTER — HOSPITAL ENCOUNTER (OUTPATIENT)
Dept: RADIOLOGY | Facility: HOSPITAL | Age: 72
Discharge: HOME | End: 2022-08-03
Attending: FAMILY MEDICINE
Payer: MEDICARE

## 2022-08-03 DIAGNOSIS — C71.6 MALIGNANT NEOPLASM OF CEREBELLOPONTINE ANGLE: ICD-10-CM

## 2022-08-03 PROCEDURE — 25500020 PHARM REV CODE 255: Performed by: FAMILY MEDICINE

## 2022-08-03 PROCEDURE — A9577 INJ MULTIHANCE: HCPCS | Performed by: FAMILY MEDICINE

## 2022-08-03 PROCEDURE — 70553 MRI BRAIN STEM W/O & W/DYE: CPT | Mod: TC

## 2022-08-03 RX ADMIN — GADOBENATE DIMEGLUMINE 10 ML: 529 INJECTION, SOLUTION INTRAVENOUS at 10:08

## 2023-03-22 ENCOUNTER — HOSPITAL ENCOUNTER (OUTPATIENT)
Dept: RADIOLOGY | Facility: HOSPITAL | Age: 73
Discharge: HOME | End: 2023-03-22
Attending: GENERAL PRACTICE
Payer: OTHER GOVERNMENT

## 2023-03-22 DIAGNOSIS — D49.6 CEREBELLAR TUMOR: ICD-10-CM

## 2023-03-22 PROCEDURE — A9577 INJ MULTIHANCE: HCPCS

## 2023-03-22 PROCEDURE — 70553 MRI BRAIN STEM W/O & W/DYE: CPT | Mod: TC

## 2023-03-22 PROCEDURE — 25500020 PHARM REV CODE 255

## 2023-03-22 RX ADMIN — GADOBENATE DIMEGLUMINE 20 ML: 529 INJECTION, SOLUTION INTRAVENOUS at 11:03

## 2023-04-16 ENCOUNTER — HOSPITAL ENCOUNTER (EMERGENCY)
Facility: HOSPITAL | Age: 73
Discharge: HOME | End: 2023-04-16
Attending: EMERGENCY MEDICINE
Payer: OTHER GOVERNMENT

## 2023-04-16 VITALS
TEMPERATURE: 98 F | HEIGHT: 69 IN | HEART RATE: 78 BPM | DIASTOLIC BLOOD PRESSURE: 77 MMHG | BODY MASS INDEX: 29.62 KG/M2 | RESPIRATION RATE: 18 BRPM | OXYGEN SATURATION: 97 % | WEIGHT: 200 LBS | SYSTOLIC BLOOD PRESSURE: 135 MMHG

## 2023-04-16 DIAGNOSIS — S91.119A: Primary | ICD-10-CM

## 2023-04-16 DIAGNOSIS — S91.119A TOE LACERATION: ICD-10-CM

## 2023-04-16 PROCEDURE — 25000003 PHARM REV CODE 250: Performed by: NURSE PRACTITIONER

## 2023-04-16 PROCEDURE — 12002 RPR S/N/AX/GEN/TRNK2.6-7.5CM: CPT

## 2023-04-16 PROCEDURE — 99284 EMERGENCY DEPT VISIT MOD MDM: CPT | Mod: 25

## 2023-04-16 RX ORDER — LIDOCAINE HYDROCHLORIDE 10 MG/ML
5 INJECTION, SOLUTION EPIDURAL; INFILTRATION; INTRACAUDAL; PERINEURAL
Status: COMPLETED | OUTPATIENT
Start: 2023-04-16 | End: 2023-04-16

## 2023-04-16 RX ORDER — CEPHALEXIN 500 MG/1
500 CAPSULE ORAL 4 TIMES DAILY
Qty: 20 CAPSULE | Refills: 0 | Status: SHIPPED | OUTPATIENT
Start: 2023-04-16 | End: 2023-04-21

## 2023-04-16 RX ADMIN — LIDOCAINE HYDROCHLORIDE 50 MG: 10 INJECTION, SOLUTION EPIDURAL; INFILTRATION; INTRACAUDAL; PERINEURAL at 01:04

## 2023-04-16 NOTE — ED PROVIDER NOTES
Encounter Date: 4/16/2023       History     Chief Complaint   Patient presents with    Toe Injury     Pt fell out of wheelchair, injuring toes. Pt has small laceration to left foot, under 3rd and 4th toes. Pt has laceration under right 5th toe.      72-year-old man presents to the emergency department with complaints of foot injury after he fell from his motorized wheelchair causing laceration to the right 5th toe as well as the toes in the left foot to the 4th and 3rd.  They deny any other complaints.  Bleeding controlled with pressure dressing prior to arrival.    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  Social History     Tobacco Use    Smoking status: Every Day     Packs/day: 0.50     Types: Cigarettes    Smokeless tobacco: Never   Substance Use Topics    Alcohol use: Yes     Review of Systems   Constitutional:  Negative for activity change, appetite change and fever.   HENT:  Negative for congestion, dental problem and sore throat.    Eyes:  Negative for discharge and itching.   Respiratory:  Negative for apnea, chest tightness and shortness of breath.    Cardiovascular:  Negative for chest pain.   Gastrointestinal:  Negative for abdominal distention, abdominal pain and nausea.   Genitourinary:  Negative for decreased urine volume, dysuria and urgency.   Musculoskeletal:  Negative for back pain.   Skin:  Positive for wound. Negative for color change and rash.   Neurological:  Negative for dizziness, facial asymmetry and weakness.   Hematological:  Does not bruise/bleed easily.   Psychiatric/Behavioral:  Negative  for agitation and behavioral problems.    All other systems reviewed and are negative.    Physical Exam     Initial Vitals [04/16/23 1305]   BP Pulse Resp Temp SpO2   135/77 78 18 98.2 °F (36.8 °C) 97 %      MAP       --         Physical Exam    Nursing note and vitals reviewed.  Constitutional: Vital signs are normal. He appears well-developed and well-nourished.  Non-toxic appearance. He does not have a sickly appearance.   HENT:   Head: Normocephalic and atraumatic.   Right Ear: External ear normal.   Left Ear: External ear normal.   Eyes: Conjunctivae, EOM and lids are normal. Pupils are equal, round, and reactive to light. Lids are everted and swept, no foreign bodies found.   Neck: Trachea normal and phonation normal. Neck supple. No thyroid mass and no thyromegaly present.   Normal range of motion.   Full passive range of motion without pain.     Cardiovascular:  Normal rate, regular rhythm, S1 normal, S2 normal, normal heart sounds, intact distal pulses and normal pulses.           Pulmonary/Chest: Breath sounds normal. No respiratory distress.   Abdominal: Abdomen is soft. There is no abdominal tenderness.   Musculoskeletal:         General: Tenderness and edema present.      Cervical back: Full passive range of motion without pain, normal range of motion and neck supple.     Lymphadenopathy:     He has no cervical adenopathy.   Neurological: He is alert and oriented to person, place, and time. He has normal strength. GCS score is 15. GCS eye subscore is 4. GCS verbal subscore is 5. GCS motor subscore is 6.   Skin: Skin is warm, dry and intact. Capillary refill takes less than 2 seconds.   Laceration to the 5th toe to the plantar side proximally 1.5 cm long with adipose exposure and no bone exposure.  Laceration to the left 4th toe is approximately 1.5 cm long with adipose and muscle tissue exposed no bone.  Laceration to the left 3rd toe is approximately 1.5 cm with adipose and muscle fiber exposed no  bone.   Psychiatric: He has a normal mood and affect. His speech is normal and behavior is normal. Judgment normal. Cognition and memory are normal.       ED Course   Lac Repair    Date/Time: 4/16/2023 3:17 PM  Performed by: JAMAAL Benavidez  Authorized by: Vincenzo Simmons MD     Consent:     Consent obtained:  Verbal    Consent given by:  Patient    Risks discussed:  Pain, need for additional repair, infection and poor cosmetic result    Alternatives discussed:  No treatment and delayed treatment  Universal protocol:     Procedure explained and questions answered to patient or proxy's satisfaction: yes      Relevant documents present and verified: yes      Test results available: yes      Imaging studies available: yes      Required blood products, implants, devices, and special equipment available: yes      Site/side marked: yes      Immediately prior to procedure, a time out was called: yes      Patient identity confirmed:  Verbally with patient, hospital-assigned identification number and arm band  Anesthesia:     Anesthesia method:  Local infiltration    Local anesthetic:  Lidocaine 1% w/o epi  Laceration details:     Location:  Toe    Toe location:  R little toe    Length (cm):  1.5  Pre-procedure details:     Preparation:  Patient was prepped and draped in usual sterile fashion  Exploration:     Hemostasis achieved with:  Direct pressure    Imaging obtained: x-ray      Imaging outcome: foreign body not noted      Wound exploration: wound explored through full range of motion      Wound extent: muscle damage      Wound extent: no areolar tissue violation noted, no fascia violation noted, no foreign bodies/material noted, no nerve damage noted, no tendon damage noted, no underlying fracture noted and no vascular damage noted      Contaminated: no    Treatment:     Area cleansed with:  Chlorhexidine and saline    Amount of cleaning:  Extensive    Irrigation solution:  Sterile saline    Irrigation  volume:  60    Irrigation method:  Syringe    Visualized foreign bodies/material removed: no      Debridement:  None    Undermining:  None    Scar revision: yes    Skin repair:     Repair method:  Sutures    Suture size:  4-0    Suture material:  Nylon    Suture technique:  Simple interrupted    Number of sutures:  6  Approximation:     Approximation:  Close  Repair type:     Repair type:  Simple  Post-procedure details:     Dressing:  Sterile dressing    Procedure completion:  Tolerated well, no immediate complications  Lac Repair    Date/Time: 4/16/2023 3:21 PM  Performed by: JAMAAL Benavidez  Authorized by: Vincenzo Simmons MD     Consent:     Consent obtained:  Verbal    Consent given by:  Patient and spouse    Risks discussed:  Infection, pain, need for additional repair, poor cosmetic result and nerve damage    Alternatives discussed:  No treatment and delayed treatment  Universal protocol:     Procedure explained and questions answered to patient or proxy's satisfaction: yes      Relevant documents present and verified: yes      Test results available: yes      Imaging studies available: yes      Required blood products, implants, devices, and special equipment available: yes      Site/side marked: yes      Immediately prior to procedure, a time out was called: yes      Patient identity confirmed:  Verbally with patient  Anesthesia:     Anesthesia method:  Local infiltration    Local anesthetic:  Lidocaine 1% w/o epi  Laceration details:     Location:  Toe    Toe location:  L fourth toe    Length (cm):  1.5    Depth (mm):  5  Pre-procedure details:     Preparation:  Patient was prepped and draped in usual sterile fashion  Exploration:     Hemostasis achieved with:  Direct pressure    Imaging obtained: x-ray      Imaging outcome: foreign body not noted      Wound exploration: wound explored through full range of motion      Wound extent: muscle damage and vascular damage      Wound extent: no areolar  tissue violation noted, no fascia violation noted, no foreign bodies/material noted, no nerve damage noted, no tendon damage noted and no underlying fracture noted      Contaminated: no    Treatment:     Area cleansed with:  Saline and chlorhexidine    Amount of cleaning:  Extensive    Irrigation solution:  Sterile saline    Irrigation volume:  60    Irrigation method:  Syringe    Visualized foreign bodies/material removed: no      Debridement:  None    Undermining:  None    Scar revision: yes    Skin repair:     Repair method:  Sutures    Suture size:  4-0    Suture material:  Nylon    Suture technique:  Simple interrupted    Number of sutures:  4  Approximation:     Approximation:  Close  Repair type:     Repair type:  Simple  Post-procedure details:     Dressing:  Sterile dressing    Procedure completion:  Tolerated well, no immediate complications  Lac Repair    Date/Time: 4/16/2023 3:23 PM  Performed by: JAMAAL Benavidez  Authorized by: Vincenzo Simmons MD     Consent:     Consent obtained:  Verbal    Consent given by:  Patient and spouse    Risks discussed:  Infection, pain, poor cosmetic result, need for additional repair, nerve damage and poor wound healing    Alternatives discussed:  Delayed treatment, observation, no treatment and referral  Universal protocol:     Procedure explained and questions answered to patient or proxy's satisfaction: yes      Relevant documents present and verified: yes      Test results available: yes      Imaging studies available: yes      Required blood products, implants, devices, and special equipment available: yes      Site/side marked: yes      Immediately prior to procedure, a time out was called: yes      Patient identity confirmed:  Verbally with patient  Anesthesia:     Anesthesia method:  Local infiltration    Local anesthetic:  Lidocaine 1% w/o epi  Laceration details:     Location:  Toe    Toe location:  L third toe    Length (cm):  1.5    Depth (mm):   5  Pre-procedure details:     Preparation:  Patient was prepped and draped in usual sterile fashion  Exploration:     Hemostasis achieved with:  Direct pressure    Imaging obtained: x-ray      Imaging outcome: foreign body not noted      Wound extent: muscle damage and vascular damage      Wound extent: no areolar tissue violation noted, no fascia violation noted, no foreign bodies/material noted, no nerve damage noted, no tendon damage noted and no underlying fracture noted      Contaminated: no    Treatment:     Area cleansed with:  Saline and chlorhexidine    Amount of cleaning:  Extensive    Irrigation solution:  Sterile saline    Irrigation volume:  60    Irrigation method:  Syringe    Visualized foreign bodies/material removed: no      Debridement:  None    Undermining:  None    Scar revision: yes    Skin repair:     Repair method:  Sutures    Suture size:  4-0    Suture material:  Nylon    Suture technique:  Simple interrupted    Number of sutures:  6  Approximation:     Approximation:  Close  Repair type:     Repair type:  Simple  Post-procedure details:     Dressing:  Sterile dressing    Procedure completion:  Tolerated well, no immediate complications  Labs Reviewed - No data to display       Imaging Results              X-Ray Foot Complete Right (Preliminary result)  Result time 04/16/23 15:26:11      Wet Read by JAMAAL Benavidez (04/16/23 15:26:11, [facility] - Emergency Dept, Emergency Medicine)    Wet read:  No obvious bony abnormality seen on plain film x-ray.  Pending radiologist's review.                                     X-Ray Foot Complete Left (Preliminary result)  Result time 04/16/23 15:26:26      Wet Read by JAMAAL Benavidez (04/16/23 15:26:26, [facility] - Emergency Dept, Emergency Medicine)    Wet read:  No obvious acute bony abnormality seen on plain film x-ray of the foot and toes.  Pending radiology review.                                      Medications   LIDOcaine (PF) 10 mg/ml (1%) injection 50 mg (50 mg Infiltration Given 4/16/23 5420)                       Medical Decision Making  72-year-old male with lacerations to the right 5th toe as well as the 4th and 3rd on the left foot presents emergency department for evaluation.  This occurred after a fall while he was in his motorized wheelchair.    Problems Addressed:  Laceration of toe without foreign body present or damage to nail, unspecified laterality, unspecified toe, initial encounter: acute illness or injury     Details: X-ray was done with no fracture.  Laceration was repaired with sutures in ED.  Wound closed appropriately and sterile dressing placed to wounds.  Discussed wound care with significant other.  Discussed antibiotics to cover for bacterial infection.  Discussed P CP follow-up in 7-10 days for suture removal.    Amount and/or Complexity of Data Reviewed  External Data Reviewed: labs and notes.  Radiology: ordered and independent interpretation performed.     Details: Pending radiologist's review            Clinical Impression:   Final diagnoses:  [S91.119A] Laceration of toe without foreign body present or damage to nail, unspecified laterality, unspecified toe, initial encounter (Primary)  [S91.119A] Toe laceration        ED Disposition Condition    Discharge Stable          ED Prescriptions       Medication Sig Dispense Start Date End Date Auth. Provider    cephALEXin (KEFLEX) 500 MG capsule Take 1 capsule (500 mg total) by mouth 4 (four) times daily. for 5 days 20 capsule 4/16/2023 4/21/2023 JAMAAL Benavidez          Follow-up Information       Follow up With Specialties Details Why Contact Info    [facility]  Schedule an appointment as soon as possible for a visit in 1 week For suture removal or your PCP             JAMAAL Benavidez  04/16/23 1548

## 2023-08-28 ENCOUNTER — HOSPITAL ENCOUNTER (EMERGENCY)
Facility: HOSPITAL | Age: 73
Discharge: ANOTHER HOSPITAL | End: 2023-08-29
Payer: OTHER GOVERNMENT

## 2023-08-28 DIAGNOSIS — T14.8XXA OPEN FRACTURE: Primary | ICD-10-CM

## 2023-08-28 PROCEDURE — 25000003 PHARM REV CODE 250: Performed by: FAMILY MEDICINE

## 2023-08-28 PROCEDURE — 12001 RPR S/N/AX/GEN/TRNK 2.5CM/<: CPT

## 2023-08-28 PROCEDURE — 12002 RPR S/N/AX/GEN/TRNK2.6-7.5CM: CPT

## 2023-08-28 PROCEDURE — 99285 EMERGENCY DEPT VISIT HI MDM: CPT | Mod: 25

## 2023-08-28 PROCEDURE — 96374 THER/PROPH/DIAG INJ IV PUSH: CPT

## 2023-08-28 RX ORDER — CEFAZOLIN SODIUM 1 G/3ML
1 INJECTION, POWDER, FOR SOLUTION INTRAMUSCULAR; INTRAVENOUS
Status: COMPLETED | OUTPATIENT
Start: 2023-08-28 | End: 2023-08-29

## 2023-08-28 RX ORDER — LIDOCAINE HYDROCHLORIDE 10 MG/ML
10 INJECTION INFILTRATION; PERINEURAL ONCE
Status: COMPLETED | OUTPATIENT
Start: 2023-08-28 | End: 2023-08-28

## 2023-08-28 RX ADMIN — LIDOCAINE HYDROCHLORIDE 10 ML: 10 INJECTION, SOLUTION INFILTRATION; PERINEURAL at 11:08

## 2023-08-29 VITALS
RESPIRATION RATE: 18 BRPM | TEMPERATURE: 98 F | BODY MASS INDEX: 37.39 KG/M2 | OXYGEN SATURATION: 98 % | HEIGHT: 64 IN | WEIGHT: 219 LBS | SYSTOLIC BLOOD PRESSURE: 145 MMHG | HEART RATE: 103 BPM | DIASTOLIC BLOOD PRESSURE: 86 MMHG

## 2023-08-29 PROCEDURE — 63600175 PHARM REV CODE 636 W HCPCS: Performed by: FAMILY MEDICINE

## 2023-08-29 RX ADMIN — CEFAZOLIN 1 G: 330 INJECTION, POWDER, FOR SOLUTION INTRAMUSCULAR; INTRAVENOUS at 01:08

## 2023-08-29 NOTE — ED PROVIDER NOTES
Encounter Date: 8/28/2023       History     Chief Complaint   Patient presents with    Fall    Laceration    Alcohol Intoxication     Patient arrives with ems, ems stated patient consumed 15 beers and fell out of wheelchair. Hematoma to the right eye area and laceration to the right small toe.      Patient presents for evaluation fall and laceration. Patient had fall PTA and hit face and cut foot on wheel chair.  Patient notes having some moderate pain in toe. Patien tdenies having any other associated symptoms at present.    The history is provided by the patient.     Review of patient's allergies indicates:   Allergen Reactions    Hydralazine analogues     Nifedipine      Past Medical History:   Diagnosis Date    Anxiety disorder, unspecified     Cancer     Diabetes mellitus     Hypertension     Major depressive disorder, single episode, unspecified     Mental disorder     Mixed hyperlipidemia      Past Surgical History:   Procedure Laterality Date    APPENDECTOMY      EYE SURGERY      FRACTURE SURGERY      HIP REPLACEMENT ARTHROPLASTY Left     SUBOCCIPITAL CRANIOTOMY Right 26 Jun 2019    Right suboccipital craniotomy for resection of ridht posterior fossa mass on 26 June 2019 /c Dr Thomas    TOTAL SHOULDER ARTHROPLASTY Right      Family History   Problem Relation Age of Onset    Depression Father     Suicide Father      Social History     Tobacco Use    Smoking status: Every Day     Current packs/day: 0.50     Types: Cigarettes    Smokeless tobacco: Never   Substance Use Topics    Alcohol use: Yes     Comment: occasion    Drug use: Not Currently     Review of Systems   Constitutional: Negative.    HENT: Negative.     Eyes: Negative.    Respiratory: Negative.     Cardiovascular: Negative.    Gastrointestinal: Negative.    Endocrine: Negative.    Genitourinary: Negative.    Musculoskeletal: Negative.    Skin:         Facial Trauma, RT Toe Laceration   Allergic/Immunologic: Negative.    Neurological: Negative.     Hematological: Negative.    Psychiatric/Behavioral: Negative.         Physical Exam     Initial Vitals [08/28/23 2151]   BP Pulse Resp Temp SpO2   95/68 105 19 98.3 °F (36.8 °C) 98 %      MAP       --         Physical Exam    Nursing note and vitals reviewed.  Constitutional: He appears well-developed and well-nourished.   HENT:   Head: Normocephalic and atraumatic.   Eyes: EOM are normal. Pupils are equal, round, and reactive to light.   Neck: Neck supple.   Normal range of motion.  Cardiovascular:  Normal rate and regular rhythm.           Pulmonary/Chest: Breath sounds normal.   Abdominal: Abdomen is soft.   Musculoskeletal:         General: Normal range of motion.      Cervical back: Normal range of motion and neck supple.     Neurological: He is alert and oriented to person, place, and time. He has normal strength and normal reflexes.   Skin: Skin is warm.   Severe Laceration RT Great Toe. 2.5 deep         ED Course   Lac Repair    Date/Time: 8/28/2023 9:49 PM    Performed by: Austen Rene MD  Authorized by: Austen Rene MD    Consent:     Consent obtained:  Verbal    Consent given by:  Patient    Alternatives discussed:  No treatment  Universal protocol:     Procedure explained and questions answered to patient or proxy's satisfaction: yes      Patient identity confirmed:  Verbally with patient  Anesthesia:     Anesthesia method:  Local infiltration    Local anesthetic:  Lidocaine 1% w/o epi  Laceration details:     Location:  Toe    Toe location:  R little toe    Length (cm):  3    Depth (mm):  10  Pre-procedure details:     Preparation:  Patient was prepped and draped in usual sterile fashion and imaging obtained to evaluate for foreign bodies  Exploration:     Limited defect created (wound extended): yes    Treatment:     Area cleansed with:  Povidone-iodine    Amount of cleaning:  Extensive  Skin repair:     Repair method:  Sutures    Suture size:  3-0    Suture material:  Nylon    Suture  technique:  Simple interrupted    Number of sutures:  5  Approximation:     Approximation:  Close    Labs Reviewed - No data to display       Imaging Results              X-Ray Toe 2 or More Views Right (Final result)  Result time 08/29/23 05:53:02      Final result by Devika Peoples III, MD (08/29/23 05:53:02)                   Impression:      1. Acute fracture of the proximal phalanx of the right 5th toe as described above.  See above comments for details.      Electronically signed by: Devika Peoples  Date:    08/29/2023  Time:    05:53               Narrative:    EXAMINATION:  STUDY: XR TOE 2 OR MORE VIEWS RIGHT    CLINICAL HISTORY AND TECHNIQUE:  Bebeto Maya, RT on 8/28/2023 11:04 PMCLINICAL HX: ER  PT FELL TODAY - ALCOHOL INTOXICATION  LACERATION TO 5TH DIGIT OF RT FOOT  PMH: N/A  TECH: 2-3 VIEWS OF 5TH DIGIT OF RT FOOT  TECHNOLOGIST: ARDEN    COMPARISON:  04/16/2023    FINDINGS:  An acute, transverse fracture is noted involving the proximal phalanx of the right 5th toe near the junction of the mid and distal thirds.  There is 3-4 mm lateral displacement of the distal fracture fragment.  I see no additional fractures or dislocations.                                       CT Maxillofacial Without Contrast (Final result)  Result time 08/28/23 23:15:52      Final result by Joaquin Murillo MD (08/28/23 23:15:52)                   Impression:        1.  A foreign body is present along the floor of the orbit as described above and I suspect that this represents a glass foreign body.  The globe appears intact.  The emergency room physician Dr. Rene was notified of the finding at approximately 23:15.    2.  A small nondisplaced fracture is seen involving the distal tip of the nasal bridge.    n/a    Category: n/a    The following dose reduction techniques are used for all CT at [facility]:    1.  Automated exposure control.    2.  Adjustment of the mA and/or kV according to patient size.    3.  Use  of interative reconstruction technique.      Electronically signed by: Joaquin Murillo  Date:    08/28/2023  Time:    23:15               Narrative:      STUDY: CT MAXILLOFACIAL/ PARANASAL SINUSES    CLINICAL HISTORY & TECHNIQUE:    Bebeto Maya, RT on 8/28/2023 10:34 PMPROCEDURE: CT MAXILLOFACIAL WO CONT  CLINICAL HX: ER  FALL TODAY - HEMATOMA TO RT EYE (ALCOHOL INTOXICATION)  PMH: HTN, UNSPECIFIED MENTAL DISORDER, EYE SX, SUBOCCIPITAL CRANIOTOMY    IV CONTRAST: NONE  ORAL CONTRAST: NONE  RECTAL CONTRAST: NONE  AXIAL IMAGING @ 5MM INTERVALS W/MULTIPLANAR RECONSTRUCTION OF IMAGES  TOTAL IMAGE NUMBER: [id number]  CTDIvol(mGy): HEAD:27.8  BODY:  DLP(mGycm): HEAD: 628 BODY:  # CT'S LAST 12 MONTHS: 2  TECH: AJR  PT TRANSPORTED W/O INCIDENT    COMPARISON:  none    PERTINENT PAST RADIOLOGIC HISTORY FOR RADIATION EXPOSURE:  1 CT scan(s) and Cardiac perfusion scan(s) on record for the last 12 months    FINDINGS:    Multiplanar imaging through the maxillofacial region/paranasal sinuses was performed.  The images show the presence of a foreign body at the floor of the left orbit measuring approximately 19 mm wide by 10 mm in the AP dimension and shows a thickness of approximately 1.5 cm.  On the  imaged, I suspect that this represents a glass foreign body.  The globe appears intact.  There is some irregularity to the distal tip of the nasal bridge compatible with a small fracture and I am unable to determine if this is an acute or old fracture that has not healed.  The bony nasal septum shows a curvature which is convex to the patient's right.  The paranasal sinuses are adequately aerated without significant mucosal thickening, air-fluid levels or opacification and the infundibula of both osteomeatal complexes appear adequately patent.  The nasal passages are adequately patent and there are findings compatible with chronic rhinitis involving the inferior nasal turbinates.  The remainder of the bony facial structures appear  unremarkable.                                       CT Head Without Contrast (Final result)  Result time 08/28/23 23:00:58      Final result by Joaquin Murillo MD (08/28/23 23:00:58)                   Impression:        1. Moderate areas of decreased attenuation involving the periventricular deep white matter on both sides compatible with moderate chronic ischemic disease.    2.  Postsurgical findings involving the right cerebellar region.    3.  A 3 x 9 mm radiopaque density is present in the inferior aspect of the left orbit having the appearance of a foreign body.  The findings were communicated to the emergency room physician Dr. Rene at 23:00 on 08/28/2023.    n/a    Category: n/a    The following dose reduction techniques are used for all CT at [facility]:    1.   Automated exposure control.    2.   Adjustment of the mA and/or kV according to patient size.    3.   Use of iterative reconstruction technique.      Electronically signed by: Joaquin Murillo  Date:    08/28/2023  Time:    23:00               Narrative:      STUDY: HEAD CT WITHOUT CONTRAST    Bebeto Maya, RT on 8/28/2023 10:32 PMPROCEDURE: CT BRAIN WO CONT  CLINICAL HX: ER  FALL TODAY - HEMATOMA TO RT EYE (ALCOHOL INTOXICATION)  PMH: HTN, UNSPECIFIED MENTAL DISORDER, EYE SX, SUBOCCIPITAL CRANIOTOMY    IV CONTRAST: NONE  ORAL CONTRAST: NONE  RECTAL CONTRAST: NONE  AXIAL IMAGING @ 5MM INTERVALS W/MULTIPLANAR RECONSTRUCTION OF IMAGES  TOTAL IMAGE NUMBER: 214  CTDIvol(mGy): HEAD:52.3  BODY:  DLP(mGycm): HEAD: 1122.9 BODY:  # CT'S LAST 12 MONTHS: 2  TECH: AJR  PT TRANSPORTED W/O INCIDENT    COMPARISON: MRI brain 03/22/2023    PERTINENT PAST RADIOLOGIC HISTORY FOR RADIATION EXPOSURE:  0 CT scan(s) and Cardiac perfusion scan(s) on record for the last 12 months    FINDINGS:    Axial imaging through the head/brain was performed.Previously noted moderate areas of decreased attenuation are again seen involving the periventricular deep  white matter on both sides compatible with moderate chronic ischemic disease.  Previously seen postsurgical findings are again noted involving the right cerebellar hemisphere with areas of postop encephalomalacia.  I see no extraxial masses or abnormal fluid collections.                                       Medications   LIDOcaine HCL 10 mg/ml (1%) injection 10 mL (10 mLs Other Given 8/28/23 2300)   ceFAZolin injection 1 g (1 g Intravenous Given 8/29/23 0107)     Medical Decision Making  Rule out Facial Fracture. Foreign Body seen on CT Face. Left Orbital Floor. Open Fracture RT Little Toe. Transfer for Ortho and Opthalmolog    Amount and/or Complexity of Data Reviewed  Radiology: ordered.    Risk  Prescription drug management.                               Clinical Impression:   Final diagnoses:  [T14.8XXA] Open fracture (Primary)        ED Disposition Condition    Transfer to Another Facility Stable                Austen Rene MD  09/23/23 6855

## 2023-09-05 ENCOUNTER — HOSPITAL ENCOUNTER (EMERGENCY)
Facility: HOSPITAL | Age: 73
Discharge: PSYCHIATRIC OR SUBSTANCE USE FACILITY | End: 2023-09-05
Attending: INTERNAL MEDICINE
Payer: OTHER GOVERNMENT

## 2023-09-05 VITALS
HEART RATE: 80 BPM | DIASTOLIC BLOOD PRESSURE: 77 MMHG | TEMPERATURE: 97 F | HEIGHT: 68 IN | WEIGHT: 230 LBS | OXYGEN SATURATION: 96 % | RESPIRATION RATE: 20 BRPM | BODY MASS INDEX: 34.86 KG/M2 | SYSTOLIC BLOOD PRESSURE: 127 MMHG

## 2023-09-05 DIAGNOSIS — F32.A DEPRESSION WITH SUICIDAL IDEATION: Primary | ICD-10-CM

## 2023-09-05 DIAGNOSIS — Z00.8 MEDICAL CLEARANCE FOR PSYCHIATRIC ADMISSION: ICD-10-CM

## 2023-09-05 DIAGNOSIS — R45.851 DEPRESSION WITH SUICIDAL IDEATION: Primary | ICD-10-CM

## 2023-09-05 LAB
ALBUMIN SERPL-MCNC: 3.4 G/DL (ref 3.4–4.8)
ALBUMIN/GLOB SERPL: 0.9 RATIO (ref 1.1–2)
ALP SERPL-CCNC: 101 UNIT/L (ref 40–150)
ALT SERPL-CCNC: 20 UNIT/L (ref 0–55)
AMPHET UR QL SCN: NEGATIVE
APAP SERPL-MCNC: <17.4 UG/ML (ref 17.4–30)
APPEARANCE UR: CLEAR
AST SERPL-CCNC: 16 UNIT/L (ref 5–34)
BARBITURATE SCN PRESENT UR: POSITIVE
BASOPHILS # BLD AUTO: 0.02 X10(3)/MCL
BASOPHILS NFR BLD AUTO: 0.3 %
BENZODIAZ UR QL SCN: POSITIVE
BILIRUB SERPL-MCNC: 0.2 MG/DL
BILIRUB UR QL STRIP.AUTO: NEGATIVE
BUN SERPL-MCNC: 27 MG/DL (ref 8.4–25.7)
CALCIUM SERPL-MCNC: 9.7 MG/DL (ref 8.8–10)
CANNABINOIDS UR QL SCN: NEGATIVE
CHLORIDE SERPL-SCNC: 105 MMOL/L (ref 98–107)
CO2 SERPL-SCNC: 29 MMOL/L (ref 23–31)
COCAINE UR QL SCN: NEGATIVE
COLOR UR: YELLOW
CREAT SERPL-MCNC: 1.14 MG/DL (ref 0.73–1.18)
EOSINOPHIL # BLD AUTO: 0.19 X10(3)/MCL (ref 0–0.9)
EOSINOPHIL NFR BLD AUTO: 3.3 %
ERYTHROCYTE [DISTWIDTH] IN BLOOD BY AUTOMATED COUNT: 14.5 % (ref 11.5–17)
ETHANOL SERPL-MCNC: <10 MG/DL
FENTANYL UR QL SCN: NEGATIVE
GFR SERPLBLD CREATININE-BSD FMLA CKD-EPI: >60 MLS/MIN/1.73/M2
GLOBULIN SER-MCNC: 3.7 GM/DL (ref 2.4–3.5)
GLUCOSE SERPL-MCNC: 210 MG/DL (ref 82–115)
GLUCOSE UR QL STRIP.AUTO: NEGATIVE
HCT VFR BLD AUTO: 35.7 % (ref 42–52)
HGB BLD-MCNC: 11.4 G/DL (ref 14–18)
IMM GRANULOCYTES # BLD AUTO: 0.02 X10(3)/MCL (ref 0–0.04)
IMM GRANULOCYTES NFR BLD AUTO: 0.3 %
KETONES UR QL STRIP.AUTO: NEGATIVE
LEUKOCYTE ESTERASE UR QL STRIP.AUTO: NEGATIVE
LYMPHOCYTES # BLD AUTO: 0.95 X10(3)/MCL (ref 0.6–4.6)
LYMPHOCYTES NFR BLD AUTO: 16.5 %
MCH RBC QN AUTO: 30.1 PG (ref 27–31)
MCHC RBC AUTO-ENTMCNC: 31.9 G/DL (ref 33–36)
MCV RBC AUTO: 94.2 FL (ref 80–94)
MDMA UR QL SCN: NEGATIVE
MONOCYTES # BLD AUTO: 0.68 X10(3)/MCL (ref 0.1–1.3)
MONOCYTES NFR BLD AUTO: 11.8 %
NEUTROPHILS # BLD AUTO: 3.89 X10(3)/MCL (ref 2.1–9.2)
NEUTROPHILS NFR BLD AUTO: 67.8 %
NITRITE UR QL STRIP.AUTO: NEGATIVE
OPIATES UR QL SCN: NEGATIVE
PCP UR QL: NEGATIVE
PH UR STRIP.AUTO: 5.5 [PH]
PH UR: 5.5 [PH] (ref 3–11)
PLATELET # BLD AUTO: 210 X10(3)/MCL (ref 130–400)
PMV BLD AUTO: 9.5 FL (ref 7.4–10.4)
POTASSIUM SERPL-SCNC: 4.4 MMOL/L (ref 3.5–5.1)
PROT SERPL-MCNC: 7.1 GM/DL (ref 5.8–7.6)
PROT UR QL STRIP.AUTO: NEGATIVE
RBC # BLD AUTO: 3.79 X10(6)/MCL (ref 4.7–6.1)
RBC UR QL AUTO: NEGATIVE
SODIUM SERPL-SCNC: 143 MMOL/L (ref 136–145)
SP GR UR STRIP.AUTO: 1.01 (ref 1–1.03)
TSH SERPL-ACNC: 0.6 UIU/ML (ref 0.35–4.94)
UROBILINOGEN UR STRIP-ACNC: 0.2
WBC # SPEC AUTO: 5.75 X10(3)/MCL (ref 4.5–11.5)

## 2023-09-05 PROCEDURE — 93010 PR ELECTROCARDIOGRAM REPORT: ICD-10-PCS | Mod: ,,, | Performed by: INTERNAL MEDICINE

## 2023-09-05 PROCEDURE — 99285 EMERGENCY DEPT VISIT HI MDM: CPT

## 2023-09-05 PROCEDURE — 80053 COMPREHEN METABOLIC PANEL: CPT | Performed by: INTERNAL MEDICINE

## 2023-09-05 PROCEDURE — 82077 ASSAY SPEC XCP UR&BREATH IA: CPT | Performed by: INTERNAL MEDICINE

## 2023-09-05 PROCEDURE — 93010 ELECTROCARDIOGRAM REPORT: CPT | Mod: ,,, | Performed by: INTERNAL MEDICINE

## 2023-09-05 PROCEDURE — 84443 ASSAY THYROID STIM HORMONE: CPT | Performed by: INTERNAL MEDICINE

## 2023-09-05 PROCEDURE — 85025 COMPLETE CBC W/AUTO DIFF WBC: CPT | Performed by: INTERNAL MEDICINE

## 2023-09-05 PROCEDURE — 81003 URINALYSIS AUTO W/O SCOPE: CPT | Mod: 59 | Performed by: INTERNAL MEDICINE

## 2023-09-05 PROCEDURE — 93005 ELECTROCARDIOGRAM TRACING: CPT

## 2023-09-05 PROCEDURE — 80307 DRUG TEST PRSMV CHEM ANLYZR: CPT | Performed by: INTERNAL MEDICINE

## 2023-09-05 PROCEDURE — 80143 DRUG ASSAY ACETAMINOPHEN: CPT | Performed by: INTERNAL MEDICINE

## 2023-09-05 NOTE — ED PROVIDER NOTES
"Date: 9/5/2023    Time of Note: 3:25 PM     Source of History:  History obtained from the patient.   Chief complaint:  Mental Health Problem (PT brought in by ambulance due to his spouse and police saying that pt "wanted a gun to harm himself." Pt denies that complaint on arrival to ER)      HPI:  Reggie Poe is a 72 y.o. year old male with history of  has a past medical history of Anxiety disorder, unspecified, Cancer, Diabetes mellitus, Hypertension, Major depressive disorder, single episode, unspecified, Mental disorder, and Mixed hyperlipidemia. presenting with Mental Health Problem (PT brought in by ambulance due to his spouse and police saying that pt "wanted a gun to harm himself." Pt denies that complaint on arrival to ER)      Review of Systems:    As per HPI and below:  Constitutional: No Fever.  No Chills.  Eyes: No Visual Changes.  ENT: None voiced by patient.    Cardiovascular: No Chest Pain.  Respiratory: No Shortness of breath. No Cough  GastrointestinaI: No Abdominal Pain.  No Nausea No Vomiting. No Diarrhea, No Constipation.  Genitourinary: No Dysuria  Neurologic: No Headache. No New Focal Weakness.  Musculoskeletal: No Back Pain.  Psychiatric:  Suicide statement, was arguing with wife and told the  that he is going to kill himself.    Allergies:    Review of patient's allergies indicates:   Allergen Reactions    Hydralazine analogues     Nifedipine        Home Medicines on Chart:    Current Outpatient Medications on File Prior to Encounter   Medication Sig Dispense Refill Last Dose    amLODIPine (NORVASC) 5 MG tablet        atorvastatin (LIPITOR) 40 MG tablet Take 40 mg by mouth once daily.       b complex vitamins capsule Take 1 capsule by mouth.       cyclobenzaprine (FLEXERIL) 10 MG tablet Take 10 mg by mouth 3 (three) times daily as needed for Muscle spasms.       HYDROcodone-acetaminophen (NORCO)  mg per tablet Take 1 tablet by mouth.       megestrol (MEGACE) 40 MG Tab " "       pantoprazole (PROTONIX) 40 MG tablet        tamsulosin (FLOMAX) 0.4 mg Cap        venlafaxine (EFFEXOR-XR) 75 MG 24 hr capsule        zolpidem (AMBIEN) 10 mg Tab           PMH:  As per HPI and below:    Past Medical History:   Diagnosis Date    Anxiety disorder, unspecified     Cancer     Diabetes mellitus     Hypertension     Major depressive disorder, single episode, unspecified     Mental disorder     Mixed hyperlipidemia        Past Surgical History:   Procedure Laterality Date    APPENDECTOMY      EYE SURGERY      FRACTURE SURGERY      HIP REPLACEMENT ARTHROPLASTY Left     SUBOCCIPITAL CRANIOTOMY Right 26 Jun 2019    Right suboccipital craniotomy for resection of ridht posterior fossa mass on 26 June 2019 /c Dr Thomas    TOTAL SHOULDER ARTHROPLASTY Right        Social History     Tobacco Use    Smoking status: Every Day     Current packs/day: 0.50     Types: Cigarettes    Smokeless tobacco: Never   Substance Use Topics    Alcohol use: Yes     Comment: occasion    Drug use: Not Currently       Family History   Problem Relation Age of Onset    Depression Father     Suicide Father         Patient Active Problem List    Diagnosis Date Noted    S/P craniotomy 02/06/2020    Brain mass 06/04/2019    Prostate cancer 06/04/2019       Physical Exam:    Vitals:    09/05/23 1439   BP: (!) 135/99   Pulse: 81   Resp: 20   Temp: 96.9 °F (36.1 °C)       BP (!) 135/99   Pulse 81   Temp 96.9 °F (36.1 °C) (Tympanic)   Resp 20   Ht 5' 8" (1.727 m)   Wt 104.3 kg (230 lb)   SpO2 95%   BMI 34.97 kg/m²     Nursing note and vital signs reviewed.    Constitutional: No acute distress.  Nontoxic  Eyes: No conjunctival injection.  Extraocular muscles are intact.  ENT: Oropharynx clear.    Cardiovascular: Regular rate and rhythm.  No murmurs.   Respiratory: No Respiratory Distress. Good Bilateral air movement.  No rhonchi. No rales.  Abdomen: Soft.  Not distended.  Nontender.  No guarding.  No rebound. Bowel Sounds " Normal.  Musculoskeletal: Good range of motion all joints.  No Gross deformities Noted.  Skin: No Obvious Rashes seen.    Neurologic:  Awake and Alert.  Cranial Nerves Grossly intact. No focal neurological deficits.  Psychiatry:  Suicidal ideation history of depression, strong family history of suicide essentially his father killed his mother and committed suicide after that.  History of depression.    Labs that have been ordered have been independently reviewed and interpreted by myself.    MEDICAL DECISION MAKING:    Reviewed Nurses Notes and Vitals.  Labs ordered AND reviewed interpreted independently.    Medical Decision Making  Suicidal ideation by stating that he wants to kill himself asking for a gun from the .  I will do psych clearance workup on him.  He will benefit from going to a [facility] psych facility.    Amount and/or Complexity of Data Reviewed  Labs: ordered.            ED COURSE         ED Orders:  Orders Placed This Encounter   Procedures    CBC auto differential    Comprehensive metabolic panel    TSH    Urinalysis, Reflex to Urine Culture    Drug Screen panel, emergency    Ethanol    Acetaminophen level    CBC with Differential    Vital signs    Undress patient and allow them to wear facility provided apparel.    Direct Psych Observation    EKG 12-lead    PEC/Psych Hold - Physicians Emergency Certificate / 72 Hour Psych Hold       ED MEDICINE:  Medications - No data to display    Admission on 09/05/2023   Component Date Value Ref Range Status    Sodium Level 09/05/2023 143  136 - 145 mmol/L Final    Potassium Level 09/05/2023 4.4  3.5 - 5.1 mmol/L Final    Chloride 09/05/2023 105  98 - 107 mmol/L Final    Carbon Dioxide 09/05/2023 29  23 - 31 mmol/L Final    Glucose Level 09/05/2023 210 (H)  82 - 115 mg/dL Final    Blood Urea Nitrogen 09/05/2023 27.0 (H)  8.4 - 25.7 mg/dL Final    Creatinine 09/05/2023 1.14  0.73 - 1.18 mg/dL Final    Calcium Level Total 09/05/2023 9.7  8.8 - 10.0  mg/dL Final    Protein Total 09/05/2023 7.1  5.8 - 7.6 gm/dL Final    Albumin Level 09/05/2023 3.4  3.4 - 4.8 g/dL Final    Globulin 09/05/2023 3.7 (H)  2.4 - 3.5 gm/dL Final    Albumin/Globulin Ratio 09/05/2023 0.9 (L)  1.1 - 2.0 ratio Final    Bilirubin Total 09/05/2023 0.2  <=1.5 mg/dL Final    Alkaline Phosphatase 09/05/2023 101  40 - 150 unit/L Final    Alanine Aminotransferase 09/05/2023 20  0 - 55 unit/L Final    Aspartate Aminotransferase 09/05/2023 16  5 - 34 unit/L Final    eGFR 09/05/2023 >60  mls/min/1.73/m2 Final    Thyroid Stimulating Hormone 09/05/2023 0.599  0.350 - 4.940 uIU/mL Final    Color, UA 09/05/2023 Yellow  Yellow, Light-Yellow, Dark Yellow, Mora, Straw Final    Appearance, UA 09/05/2023 Clear  Clear Final    Specific Gravity, UA 09/05/2023 1.015  1.005 - 1.030 Final    pH, UA 09/05/2023 5.5  5.0 - 8.5 Final    Protein, UA 09/05/2023 Negative  Negative Final    Glucose, UA 09/05/2023 Negative  Negative, Normal Final    Ketones, UA 09/05/2023 Negative  Negative Final    Blood, UA 09/05/2023 Negative  Negative Final    Bilirubin, UA 09/05/2023 Negative  Negative Final    Urobilinogen, UA 09/05/2023 0.2  0.2, 1.0, Normal Final    Nitrites, UA 09/05/2023 Negative  Negative Final    Leukocyte Esterase, UA 09/05/2023 Negative  Negative Final    Amphetamines, Urine 09/05/2023 Negative  Negative Final    Barbituates, Urine 09/05/2023 Positive (A)  Negative Final    Benzodiazepine, Urine 09/05/2023 Positive (A)  Negative Final    Cannabinoids, Urine 09/05/2023 Negative  Negative Final    Cocaine, Urine 09/05/2023 Negative  Negative Final    Fentanyl, Urine 09/05/2023 Negative  Negative Final    MDMA, Urine 09/05/2023 Negative  Negative Final    Opiates, Urine 09/05/2023 Negative  Negative Final    Phencyclidine, Urine 09/05/2023 Negative  Negative Final    pH, Urine 09/05/2023 5.5  3.0 - 11.0 Final    Ethanol Level 09/05/2023 <10.0  <=10.0 mg/dL Final    Acetaminophen Level 09/05/2023 <17.4 (L)   17.4 - 30.0 ug/ml Final    WBC 09/05/2023 5.75  4.50 - 11.50 x10(3)/mcL Final    RBC 09/05/2023 3.79 (L)  4.70 - 6.10 x10(6)/mcL Final    Hgb 09/05/2023 11.4 (L)  14.0 - 18.0 g/dL Final    Hct 09/05/2023 35.7 (L)  42.0 - 52.0 % Final    MCV 09/05/2023 94.2 (H)  80.0 - 94.0 fL Final    MCH 09/05/2023 30.1  27.0 - 31.0 pg Final    MCHC 09/05/2023 31.9 (L)  33.0 - 36.0 g/dL Final    RDW 09/05/2023 14.5  11.5 - 17.0 % Final    Platelet 09/05/2023 210  130 - 400 x10(3)/mcL Final    MPV 09/05/2023 9.5  7.4 - 10.4 fL Final    Neut % 09/05/2023 67.8  % Final    Lymph % 09/05/2023 16.5  % Final    Mono % 09/05/2023 11.8  % Final    Eos % 09/05/2023 3.3  % Final    Basophil % 09/05/2023 0.3  % Final    Lymph # 09/05/2023 0.95  0.6 - 4.6 x10(3)/mcL Final    Neut # 09/05/2023 3.89  2.1 - 9.2 x10(3)/mcL Final    Mono # 09/05/2023 0.68  0.1 - 1.3 x10(3)/mcL Final    Eos # 09/05/2023 0.19  0 - 0.9 x10(3)/mcL Final    Baso # 09/05/2023 0.02  <=0.2 x10(3)/mcL Final    IG# 09/05/2023 0.02  0 - 0.04 x10(3)/mcL Final    IG% 09/05/2023 0.3  % Final             No orders to display       Psych Clearance:    At the time of my examination, patient does not appear to have any major medical condition which needs to be addressed by admission to hospital and appears to be in medically optimal condition to undergo a psychiatric evaluation and treatment as needed in a psych facility, Patient will be in a monitored facility and they can always bring back to our ER or the nearest ER for reevaluation in case develops any other symptoms.    Medically cleared for psychiatry placement: 9/5/2023  4:58 PM                 Diagnostic Impression:        ICD-10-CM ICD-9-CM   1. Depression with suicidal ideation  F32.A 311    R45.851 V62.84   2. Medical clearance for psychiatric admission  Z00.8 V70.8      ED Disposition Condition    Transfer to Psych Facility Stable          ED Prescriptions    None       Follow-up Information    None          Medication  List        ASK your doctor about these medications      amLODIPine 5 MG tablet  Commonly known as: NORVASC     atorvastatin 40 MG tablet  Commonly known as: LIPITOR     b complex vitamins capsule     cyclobenzaprine 10 MG tablet  Commonly known as: FLEXERIL     HYDROcodone-acetaminophen  mg per tablet  Commonly known as: NORCO     megestroL 40 MG Tab  Commonly known as: MEGACE     pantoprazole 40 MG tablet  Commonly known as: PROTONIX     tamsulosin 0.4 mg Cap  Commonly known as: FLOMAX     venlafaxine 75 MG 24 hr capsule  Commonly known as: EFFEXOR-XR     zolpidem 10 mg Tab  Commonly known as: Laurel Evangelista MD  09/05/23 7001

## 2023-09-25 ENCOUNTER — LAB REQUISITION (OUTPATIENT)
Dept: LAB | Facility: HOSPITAL | Age: 73
End: 2023-09-25
Attending: INTERNAL MEDICINE
Payer: OTHER GOVERNMENT

## 2023-09-25 DIAGNOSIS — E08.9 DIABETES MELLITUS DUE TO UNDERLYING CONDITION WITHOUT COMPLICATIONS: ICD-10-CM

## 2023-09-25 DIAGNOSIS — I10 ESSENTIAL (PRIMARY) HYPERTENSION: ICD-10-CM

## 2023-09-25 DIAGNOSIS — R52 PAIN, UNSPECIFIED: ICD-10-CM

## 2023-09-25 DIAGNOSIS — Z79.899 OTHER LONG TERM (CURRENT) DRUG THERAPY: ICD-10-CM

## 2023-09-25 DIAGNOSIS — E78.5 HYPERLIPIDEMIA, UNSPECIFIED: ICD-10-CM

## 2023-09-25 LAB
ALBUMIN SERPL-MCNC: 3.7 G/DL (ref 3.4–5)
ALBUMIN/GLOB SERPL: 1.3 RATIO
ALP SERPL-CCNC: 122 UNIT/L (ref 50–144)
ALT SERPL-CCNC: 32 UNIT/L (ref 1–45)
ANION GAP SERPL CALC-SCNC: 4 MEQ/L (ref 2–13)
AST SERPL-CCNC: 36 UNIT/L (ref 17–59)
BASOPHILS # BLD AUTO: 0.03 X10(3)/MCL (ref 0.01–0.08)
BASOPHILS NFR BLD AUTO: 0.6 % (ref 0.1–1.2)
BILIRUB SERPL-MCNC: 0.5 MG/DL (ref 0–1)
BUN SERPL-MCNC: 21 MG/DL (ref 7–20)
CALCIUM SERPL-MCNC: 9.2 MG/DL (ref 8.4–10.2)
CHLORIDE SERPL-SCNC: 102 MMOL/L (ref 98–110)
CO2 SERPL-SCNC: 33 MMOL/L (ref 21–32)
CREAT SERPL-MCNC: 1.01 MG/DL (ref 0.66–1.25)
CREAT/UREA NIT SERPL: 21 (ref 12–20)
EOSINOPHIL # BLD AUTO: 0.17 X10(3)/MCL (ref 0.04–0.54)
EOSINOPHIL NFR BLD AUTO: 3.4 % (ref 0.7–7)
ERYTHROCYTE [DISTWIDTH] IN BLOOD BY AUTOMATED COUNT: 14.3 %
EST. AVERAGE GLUCOSE BLD GHB EST-MCNC: 128.4 MG/DL (ref 70–115)
GFR SERPLBLD CREATININE-BSD FMLA CKD-EPI: 80 MLS/MIN/1.73/M2
GLOBULIN SER-MCNC: 2.8 GM/DL (ref 2–3.9)
GLUCOSE SERPL-MCNC: 127 MG/DL (ref 70–115)
HBA1C MFR BLD: 6.1 % (ref 4–6)
HCT VFR BLD AUTO: 37.4 % (ref 36–52)
HGB BLD-MCNC: 12.4 G/DL (ref 13–18)
IMM GRANULOCYTES # BLD AUTO: 0.01 X10(3)/MCL (ref 0–0.03)
IMM GRANULOCYTES NFR BLD AUTO: 0.2 % (ref 0–0.5)
LYMPHOCYTES # BLD AUTO: 1.31 X10(3)/MCL (ref 1.32–3.57)
LYMPHOCYTES NFR BLD AUTO: 26 % (ref 20–55)
MCH RBC QN AUTO: 30.4 PG (ref 27–34)
MCHC RBC AUTO-ENTMCNC: 33.2 G/DL (ref 31–37)
MCV RBC AUTO: 91.7 FL (ref 79–99)
MONOCYTES # BLD AUTO: 0.7 X10(3)/MCL (ref 0.3–0.82)
MONOCYTES NFR BLD AUTO: 13.9 % (ref 4.7–12.5)
NEUTROPHILS # BLD AUTO: 2.81 X10(3)/MCL (ref 1.78–5.38)
NEUTROPHILS NFR BLD AUTO: 55.9 % (ref 37–73)
NRBC BLD AUTO-RTO: 0 %
PLATELET # BLD AUTO: 219 X10(3)/MCL (ref 140–371)
PMV BLD AUTO: 10 FL (ref 9.4–12.4)
POTASSIUM SERPL-SCNC: 4.5 MMOL/L (ref 3.5–5.1)
PROT SERPL-MCNC: 6.5 GM/DL (ref 6.3–8.2)
RBC # BLD AUTO: 4.08 X10(6)/MCL (ref 4–6)
SODIUM SERPL-SCNC: 139 MMOL/L (ref 135–145)
WBC # SPEC AUTO: 5.03 X10(3)/MCL (ref 4–11.5)

## 2023-09-25 PROCEDURE — 80053 COMPREHEN METABOLIC PANEL: CPT | Performed by: INTERNAL MEDICINE

## 2023-09-25 PROCEDURE — 85025 COMPLETE CBC W/AUTO DIFF WBC: CPT | Performed by: INTERNAL MEDICINE

## 2023-09-25 PROCEDURE — 83036 HEMOGLOBIN GLYCOSYLATED A1C: CPT | Performed by: INTERNAL MEDICINE

## 2023-10-11 ENCOUNTER — LAB REQUISITION (OUTPATIENT)
Dept: LAB | Facility: HOSPITAL | Age: 73
End: 2023-10-11
Attending: INTERNAL MEDICINE
Payer: OTHER GOVERNMENT

## 2023-10-11 DIAGNOSIS — L03.811 CELLULITIS OF HEAD (ANY PART, EXCEPT FACE): ICD-10-CM

## 2023-10-11 LAB
BASOPHILS # BLD AUTO: 0.02 X10(3)/MCL (ref 0.01–0.08)
BASOPHILS NFR BLD AUTO: 0.2 % (ref 0.1–1.2)
EOSINOPHIL # BLD AUTO: 0.16 X10(3)/MCL (ref 0.04–0.54)
EOSINOPHIL NFR BLD AUTO: 1.9 % (ref 0.7–7)
ERYTHROCYTE [DISTWIDTH] IN BLOOD BY AUTOMATED COUNT: 14 %
HCT VFR BLD AUTO: 35 % (ref 36–52)
HGB BLD-MCNC: 11.8 G/DL (ref 13–18)
IMM GRANULOCYTES # BLD AUTO: 0.04 X10(3)/MCL (ref 0–0.03)
IMM GRANULOCYTES NFR BLD AUTO: 0.5 % (ref 0–0.5)
LYMPHOCYTES # BLD AUTO: 1.06 X10(3)/MCL (ref 1.32–3.57)
LYMPHOCYTES NFR BLD AUTO: 12.6 % (ref 20–55)
MCH RBC QN AUTO: 30.6 PG (ref 27–34)
MCHC RBC AUTO-ENTMCNC: 33.7 G/DL (ref 31–37)
MCV RBC AUTO: 90.7 FL (ref 79–99)
MONOCYTES # BLD AUTO: 0.95 X10(3)/MCL (ref 0.3–0.82)
MONOCYTES NFR BLD AUTO: 11.3 % (ref 4.7–12.5)
NEUTROPHILS # BLD AUTO: 6.19 X10(3)/MCL (ref 1.78–5.38)
NEUTROPHILS NFR BLD AUTO: 73.5 % (ref 37–73)
NRBC BLD AUTO-RTO: 0 %
PLATELET # BLD AUTO: 247 X10(3)/MCL (ref 140–371)
PMV BLD AUTO: 10.4 FL (ref 9.4–12.4)
RBC # BLD AUTO: 3.86 X10(6)/MCL (ref 4–6)
URATE SERPL-MCNC: 7.1 MG/DL (ref 2.6–7.2)
WBC # SPEC AUTO: 8.42 X10(3)/MCL (ref 4–11.5)

## 2023-10-11 PROCEDURE — 84550 ASSAY OF BLOOD/URIC ACID: CPT | Performed by: INTERNAL MEDICINE

## 2023-10-11 PROCEDURE — 85025 COMPLETE CBC W/AUTO DIFF WBC: CPT | Performed by: INTERNAL MEDICINE

## 2024-03-19 ENCOUNTER — HOSPITAL ENCOUNTER (OUTPATIENT)
Dept: RADIOLOGY | Facility: HOSPITAL | Age: 74
Discharge: HOME | End: 2024-03-19
Attending: INTERNAL MEDICINE
Payer: OTHER GOVERNMENT

## 2024-03-19 DIAGNOSIS — R60.9 EDEMA: ICD-10-CM

## 2024-03-19 PROCEDURE — 93970 EXTREMITY STUDY: CPT | Mod: TC

## 2024-07-15 ENCOUNTER — HOSPITAL ENCOUNTER (OUTPATIENT)
Dept: RADIOLOGY | Facility: HOSPITAL | Age: 74
Discharge: HOME | End: 2024-07-15
Attending: STUDENT IN AN ORGANIZED HEALTH CARE EDUCATION/TRAINING PROGRAM
Payer: OTHER GOVERNMENT

## 2024-07-15 ENCOUNTER — HOSPITAL ENCOUNTER (EMERGENCY)
Facility: HOSPITAL | Age: 74
Discharge: HOME | End: 2024-07-15
Attending: FAMILY MEDICINE
Payer: OTHER GOVERNMENT

## 2024-07-15 VITALS
HEIGHT: 69 IN | WEIGHT: 200 LBS | HEART RATE: 79 BPM | RESPIRATION RATE: 20 BRPM | SYSTOLIC BLOOD PRESSURE: 140 MMHG | TEMPERATURE: 98 F | OXYGEN SATURATION: 96 % | BODY MASS INDEX: 29.62 KG/M2 | DIASTOLIC BLOOD PRESSURE: 87 MMHG

## 2024-07-15 DIAGNOSIS — R20.2 NUMBNESS AND TINGLING OF BOTH LEGS: ICD-10-CM

## 2024-07-15 DIAGNOSIS — R52 PAIN: ICD-10-CM

## 2024-07-15 DIAGNOSIS — M77.52 BURSITIS OF LEFT ANKLE: Primary | ICD-10-CM

## 2024-07-15 DIAGNOSIS — R20.0 NUMBNESS AND TINGLING OF BOTH LEGS: ICD-10-CM

## 2024-07-15 PROCEDURE — 99284 EMERGENCY DEPT VISIT MOD MDM: CPT | Mod: 25

## 2024-07-15 PROCEDURE — 72131 CT LUMBAR SPINE W/O DYE: CPT | Mod: TC

## 2024-07-15 RX ORDER — COLCHICINE 0.6 MG/1
0.6 TABLET ORAL DAILY
Qty: 3 TABLET | Refills: 0 | Status: SHIPPED | OUTPATIENT
Start: 2024-07-15

## 2024-07-15 RX ORDER — HYDROCODONE BITARTRATE AND ACETAMINOPHEN 5; 325 MG/1; MG/1
1 TABLET ORAL EVERY 6 HOURS PRN
Qty: 15 TABLET | Refills: 0 | Status: SHIPPED | OUTPATIENT
Start: 2024-07-15

## 2024-07-15 RX ORDER — NAPROXEN 500 MG/1
500 TABLET ORAL 2 TIMES DAILY WITH MEALS
Qty: 14 TABLET | Refills: 0 | Status: SHIPPED | OUTPATIENT
Start: 2024-07-15 | End: 2024-07-22

## 2024-07-15 NOTE — ED PROVIDER NOTES
Encounter Date: 7/15/2024       History     Chief Complaint   Patient presents with    Foot Injury     PT to ER with care taker, C/O left leg and foot pain. Bruising noted to arch of foot.      Patient reports left ankle/foot pain.  No injury.  Has been going on for a few months but got worse this weekend    The history is provided by the patient and a relative.   Foot Injury   There was no injury mechanism. The pain is present in the left foot. The quality of the pain is described as aching and throbbing. He reports no foreign bodies present.     Review of patient's allergies indicates:   Allergen Reactions    Hydralazine analogues     Nifedipine      Past Medical History:   Diagnosis Date    Anxiety disorder, unspecified     Cancer     Diabetes mellitus     Hypertension     Major depressive disorder, single episode, unspecified     Mental disorder     Mixed hyperlipidemia      Past Surgical History:   Procedure Laterality Date    APPENDECTOMY      EYE SURGERY      FRACTURE SURGERY      HIP REPLACEMENT ARTHROPLASTY Left     SUBOCCIPITAL CRANIOTOMY Right 26 Jun 2019    Right suboccipital craniotomy for resection of ridht posterior fossa mass on 26 June 2019 /c Dr Thomas    TOTAL SHOULDER ARTHROPLASTY Right      Family History   Problem Relation Name Age of Onset    Depression Father      Suicide Father       Social History     Tobacco Use    Smoking status: Every Day     Current packs/day: 0.50     Types: Cigarettes    Smokeless tobacco: Never   Substance Use Topics    Alcohol use: Yes     Comment: occasion    Drug use: Not Currently     Review of Systems   Musculoskeletal:  Positive for arthralgias.   All other systems reviewed and are negative.      Physical Exam     Initial Vitals   BP Pulse Resp Temp SpO2   07/15/24 0837 07/15/24 0837 07/15/24 0837 07/15/24 0839 07/15/24 0837   (!) 150/79 84 18 98 °F (36.7 °C) 96 %      MAP       --                Physical Exam    Nursing note and vitals  reviewed.  Constitutional: He appears well-developed and well-nourished. He is not diaphoretic. No distress.   HENT:   Head: Normocephalic and atraumatic.   Nose: Nose normal.   Mouth/Throat: Oropharynx is clear and moist.   Eyes: Conjunctivae and EOM are normal. Pupils are equal, round, and reactive to light.   Neck: Neck supple. No tracheal deviation present.   Normal range of motion.  Cardiovascular:  Normal rate, intact distal pulses and normal pulses.     Exam reveals no decreased pulses.       Pulmonary/Chest: Effort normal. No respiratory distress.   Normal rate   Abdominal: Abdomen is soft. He exhibits no distension. There is no abdominal tenderness.   Musculoskeletal:         General: No edema. Normal range of motion.      Cervical back: Normal range of motion and neck supple.      Comments: No Acute Change, nontender, no deformity, bruise to bottom of the left foot     Neurological: He is alert and oriented to person, place, and time. GCS score is 15. GCS eye subscore is 4. GCS verbal subscore is 5. GCS motor subscore is 6.   No acute change   Skin: Skin is warm and dry. No rash noted.   Psychiatric: He has a normal mood and affect. Thought content normal.         ED Course   Procedures  Labs Reviewed - No data to display       Imaging Results              X-Ray Foot Complete Left (Final result)  Result time 07/15/24 09:11:13      Final result by Devika Peoples III, MD (07/15/24 09:11:13)                   Impression:      1. No significant abnormalities are noted.      Electronically signed by: Devika Peoples  Date:    07/15/2024  Time:    09:11               Narrative:    EXAMINATION:  STUDY: XR FOOT COMPLETE 3 VIEW LEFT    CLINICAL HISTORY AND TECHNIQUE:  Pain    COMPARISON:  None    FINDINGS:  I see no definite fractures, dislocations, or other significant abnormalities.                                    X-Rays:   Independently Interpreted Readings:   Other Readings:  Foot x-ray: No acute  fracture    Medications - No data to display  Medical Decision Making  Amount and/or Complexity of Data Reviewed  Radiology: ordered.      Additional MDM:   Differential Diagnosis:   Bursitis arthritis, sprain, strain, fracture, gout                                    Clinical Impression:  Final diagnoses:  [R52] Pain  [M77.52] Bursitis of left ankle (Primary)          ED Disposition Condition    Discharge Stable          ED Prescriptions    None       Follow-up Information       Follow up With Specialties Details Why Contact Info    Nory Jackman MD Internal Medicine   [address] [place] [id number]  [phone]               Damian Simpson MD  07/15/24 6077

## 2024-07-24 ENCOUNTER — HOSPITAL ENCOUNTER (EMERGENCY)
Facility: HOSPITAL | Age: 74
Discharge: HOME | End: 2024-07-24
Attending: FAMILY MEDICINE
Payer: OTHER GOVERNMENT

## 2024-07-24 VITALS
HEART RATE: 86 BPM | RESPIRATION RATE: 16 BRPM | OXYGEN SATURATION: 96 % | SYSTOLIC BLOOD PRESSURE: 156 MMHG | DIASTOLIC BLOOD PRESSURE: 88 MMHG | BODY MASS INDEX: 29.62 KG/M2 | HEIGHT: 69 IN | WEIGHT: 200 LBS | TEMPERATURE: 98 F

## 2024-07-24 DIAGNOSIS — M79.605 LEFT LEG PAIN: Primary | ICD-10-CM

## 2024-07-24 DIAGNOSIS — R52 PAIN: ICD-10-CM

## 2024-07-24 PROCEDURE — 99283 EMERGENCY DEPT VISIT LOW MDM: CPT | Mod: 25

## 2024-07-24 NOTE — DISCHARGE INSTRUCTIONS
Schedule an appointment with your primary care provider to continue workup as an outpatient.  Continue all medications previously prescribed

## 2024-07-24 NOTE — ED NOTES
[facility] [place], asked Nurse what imaging the Dr wanted specifically due to patient already having x-rays on ankle last week. Nurse is currently on phone attempting to contact [facility] to clarify what he wants.

## 2024-07-24 NOTE — ED PROVIDER NOTES
"Encounter Date: 7/24/2024       History     Chief Complaint   Patient presents with    Leg Pain     PT to ER stating [facility] [place] [facility] and told him to go to ER for a "full leg X-Ray"     Patient sent here for leg x-ray.  Patient recently seen here for left ankle pain and swelling.  X-ray was normal.  States he was [facility] to come get [facility] of the rest of the leg.  Patient states she has had no injuries and thinks the pain is nerve related    The history is provided by the patient.   Leg Pain   There was no injury mechanism. Illness onset: UNKnown. The pain is present in the left leg. Pertinent negatives include no numbness. The symptoms are aggravated by bearing weight.     Review of patient's allergies indicates:   Allergen Reactions    Hydralazine analogues     Nifedipine      Past Medical History:   Diagnosis Date    Anxiety disorder, unspecified     Cancer     Diabetes mellitus     Hypertension     Major depressive disorder, single episode, unspecified     Mental disorder     Mixed hyperlipidemia      Past Surgical History:   Procedure Laterality Date    APPENDECTOMY      EYE SURGERY      FRACTURE SURGERY      HIP REPLACEMENT ARTHROPLASTY Left     SUBOCCIPITAL CRANIOTOMY Right 26 Jun 2019    Right suboccipital craniotomy for resection of ridht posterior fossa mass on 26 June 2019 /c Dr Thomas    TOTAL SHOULDER ARTHROPLASTY Right      Family History   Problem Relation Name Age of Onset    Depression Father      Suicide Father       Social History     Tobacco Use    Smoking status: Every Day     Current packs/day: 0.50     Types: Cigarettes    Smokeless tobacco: Never   Substance Use Topics    Alcohol use: Yes     Comment: occasion    Drug use: Not Currently     Review of Systems   Neurological:  Negative for weakness and numbness.   All other systems reviewed and are negative.      Physical Exam     Initial Vitals [07/24/24 0918]   BP Pulse Resp Temp SpO2   (!) 141/79 89 (!) 21 " 98.3 °F (36.8 °C) 96 %      MAP       --         Physical Exam    Nursing note and vitals reviewed.  Constitutional: He appears well-developed and well-nourished. He is not diaphoretic. No distress.   HENT:   Head: Normocephalic and atraumatic.   Nose: Nose normal.   Mouth/Throat: Oropharynx is clear and moist.   Eyes: Conjunctivae and EOM are normal. Pupils are equal, round, and reactive to light.   Neck: Neck supple. No tracheal deviation present.   Normal range of motion.  Cardiovascular:  Normal rate, intact distal pulses and normal pulses.     Exam reveals no decreased pulses.       Pulmonary/Chest: Effort normal. No respiratory distress.   Normal rate   Abdominal: Abdomen is soft. He exhibits no distension. There is no abdominal tenderness.   Musculoskeletal:         General: No edema. Normal range of motion.      Cervical back: Normal range of motion and neck supple.      Left ankle: Tenderness present.      Comments: No Acute DEformity     Neurological: He is alert and oriented to person, place, and time. GCS score is 15. GCS eye subscore is 4. GCS verbal subscore is 5. GCS motor subscore is 6.   No acute change   Skin: Skin is warm and dry. No rash noted.   Psychiatric: He has a normal mood and affect. Thought content normal.         ED Course   Procedures  Labs Reviewed - No data to display       Imaging Results              X-Ray Femur 2 View Left (Final result)  Result time 07/24/24 10:11:43      Final result by Miguel Samuels MD (07/24/24 10:11:43)                   Impression:      No acute osseous finding.      Electronically signed by: Miguel Samuels MD  Date:    07/24/2024  Time:    10:11               Narrative:    EXAMINATION:  Left femur two views, left tibia and fibula two views    CLINICAL HISTORY:  Pain    COMPARISON:  None    FINDINGS:  There is no acute fracture subluxation seen.  There is postop changes from left hip hemiarthroplasty.  No significant joint space narrowing.  No erosive or  proliferative changes.  No focal soft tissue swelling.  No joint effusion seen.                                       X-Ray Tibia Fibula 2 View Left (Final result)  Result time 07/24/24 10:11:43      Final result by Miguel Samuels MD (07/24/24 10:11:43)                   Impression:      No acute osseous finding.      Electronically signed by: Miguel Samuels MD  Date:    07/24/2024  Time:    10:11               Narrative:    EXAMINATION:  Left femur two views, left tibia and fibula two views    CLINICAL HISTORY:  Pain    COMPARISON:  None    FINDINGS:  There is no acute fracture subluxation seen.  There is postop changes from left hip hemiarthroplasty.  No significant joint space narrowing.  No erosive or proliferative changes.  No focal soft tissue swelling.  No joint effusion seen.                                    X-Rays:   Independently Interpreted Readings:   Other Readings:  Tib fib x-ray: No acute fracture     Femur x-ray: No acute fracture    Medications - No data to display  Medical Decision Making  73-year-old male sent here for x-ray of leg.  X-ray showed no acute fracture.  Discharge and have patient follow up with his PCP for continued outpatient workup    Amount and/or Complexity of Data Reviewed  External Data Reviewed: notes.  Radiology: ordered and independent interpretation performed. Decision-making details documented in ED Course.      Additional MDM:   Differential Diagnosis:   Sprain, strain, fracture, bursitis, gout, arthritis                                    Clinical Impression:  Final diagnoses:  [R52] Pain  [M79.605] Left leg pain (Primary)          ED Disposition Condition    Discharge Stable          ED Prescriptions    None       Follow-up Information       Follow up With Specialties Details Why Contact Info    Nory Jackman MD Internal Medicine Schedule an appointment as soon as possible for a visit   [address] [place] [id number]  [phone]                Damian Simpson MD  07/24/24 5098

## 2024-07-29 ENCOUNTER — HOSPITAL ENCOUNTER (EMERGENCY)
Facility: HOSPITAL | Age: 74
Discharge: HOME | End: 2024-07-29
Attending: FAMILY MEDICINE
Payer: OTHER GOVERNMENT

## 2024-07-29 VITALS
OXYGEN SATURATION: 96 % | TEMPERATURE: 98 F | SYSTOLIC BLOOD PRESSURE: 145 MMHG | HEIGHT: 69 IN | BODY MASS INDEX: 31.1 KG/M2 | RESPIRATION RATE: 20 BRPM | WEIGHT: 210 LBS | HEART RATE: 84 BPM | DIASTOLIC BLOOD PRESSURE: 83 MMHG

## 2024-07-29 DIAGNOSIS — M79.606 LEG PAIN: ICD-10-CM

## 2024-07-29 DIAGNOSIS — R26.9 NEUROLOGIC GAIT DYSFUNCTION: Primary | ICD-10-CM

## 2024-07-29 PROCEDURE — 99284 EMERGENCY DEPT VISIT MOD MDM: CPT | Mod: 25

## 2024-07-29 NOTE — ED PROVIDER NOTES
Encounter Date: 7/29/2024       History     Chief Complaint   Patient presents with    [facility]      [facility] to have CT of head, mri of back, x-ray of left knee and hip.     Patient presents for evaluation of leg weakness leg pain.  Patient notes having difficulty walking for the past 10 days or proximally patient was seen on the 24th in the emergency room and was given x-rays of the femur which were negative.  Will notes having ongoing pain at present.  Pain is moderate aching pain that is worse with standing and movement.  Patient denies having any trauma that precipitated his symptoms.  Patient denies having any other associated symptoms at present.    The history is provided by the patient.     Review of patient's allergies indicates:   Allergen Reactions    Hydralazine analogues     Nifedipine      Past Medical History:   Diagnosis Date    Anxiety disorder, unspecified     Cancer     Diabetes mellitus     Hypertension     Major depressive disorder, single episode, unspecified     Mental disorder     Mixed hyperlipidemia      Past Surgical History:   Procedure Laterality Date    APPENDECTOMY      EYE SURGERY      FRACTURE SURGERY      HIP REPLACEMENT ARTHROPLASTY Left     SUBOCCIPITAL CRANIOTOMY Right 26 Jun 2019    Right suboccipital craniotomy for resection of ridht posterior fossa mass on 26 June 2019 /c Dr Thomas    TOTAL SHOULDER ARTHROPLASTY Right      Family History   Problem Relation Name Age of Onset    Depression Father      Suicide Father       Social History     Tobacco Use    Smoking status: Every Day     Current packs/day: 0.50     Types: Cigarettes    Smokeless tobacco: Never   Substance Use Topics    Alcohol use: Yes     Comment: occasion    Drug use: Not Currently     Review of Systems   Constitutional: Negative.    HENT: Negative.     Eyes: Negative.    Respiratory: Negative.     Cardiovascular: Negative.    Gastrointestinal:  Negative.    Endocrine: Negative.    Genitourinary: Negative.    Musculoskeletal:  Positive for gait problem.        Gait dysfunction.   Allergic/Immunologic: Negative.    Hematological: Negative.    Psychiatric/Behavioral: Negative.         Physical Exam     Initial Vitals [07/29/24 0930]   BP Pulse Resp Temp SpO2   (!) 144/81 87 18 98.2 °F (36.8 °C) 96 %      MAP       --         Physical Exam    Vitals reviewed.  Constitutional: He appears well-developed and well-nourished. He is not diaphoretic. No distress.   HENT:   Head: Normocephalic and atraumatic.   Mouth/Throat: No oropharyngeal exudate.   Eyes: Conjunctivae and EOM are normal. Pupils are equal, round, and reactive to light.   Neck: Neck supple. No thyromegaly present. No tracheal deviation present. No JVD present.   Normal range of motion.  Cardiovascular:  Normal rate, regular rhythm and normal heart sounds.     Exam reveals no gallop and no friction rub.       No murmur heard.  Pulmonary/Chest: Breath sounds normal. No stridor. No respiratory distress. He has no wheezes. He has no rhonchi. He has no rales. He exhibits no tenderness.   Abdominal: Abdomen is soft. Bowel sounds are normal. He exhibits no distension and no mass. There is no abdominal tenderness. There is no rebound and no guarding.   Musculoskeletal:      Cervical back: Normal range of motion and neck supple.      Comments: Decreased range of motion left hip.     Neurological: He is alert and oriented to person, place, and time. He displays normal reflexes. No cranial nerve deficit. GCS score is 15. GCS eye subscore is 4. GCS verbal subscore is 5. GCS motor subscore is 6.   Skin: Capillary refill takes less than 2 seconds.   Psychiatric: He has a normal mood and affect.         ED Course   Procedures  Labs Reviewed - No data to display       Imaging Results              CT Head Without Contrast (Final result)  Result time 07/29/24 11:05:00      Final result by Devika Peoples III, MD  (07/29/24 11:05:00)                   Impression:      1. Chronic changes are present as described above and as seen previously.  See above comments for details.      Electronically signed by: Devika Peoples  Date:    07/29/2024  Time:    11:05               Narrative:    EXAMINATION:  STUDY: CT HEAD WITHOUT CONTRAST    CLINICAL HISTORY AND TECHNIQUE:  Weakness    This patient has had 4 CT and nuclear medicine scans performed within the last 12 months.    The following DOSE REDUCTION TECHNIQUES are used for all CT scans at [facility]:    1. Automated exposure control.  2. Adjustment of the mA and/or kv according to patient size.  3. Use of iterative reconstruction technique.    COMPARISON:  08/28/2023    FINDINGS:  Axial imaging through the head/brain was performed. Postoperative changes are present compatible with previous craniotomy within the occipital region.  A wedge-shaped area of postoperative encephalomalacia versus an old, nonhemorrhagic infarct is noted within the cerebellum within the midline and to the right of midline as seen on previous imaging.  There is mild cerebral atrophy accentuating the ventricles and sulci with moderate chronic ischemic changes noted within the deep white matter of both cerebral hemispheres as was seen previously.  I see no intraparenchymal masses or hemorrhagic lesions.  I see no extra-axial masses or abnormal fluid collections.  Postoperative changes are noted involving the left maxillofacial region as seen previously.                                       X-Ray Foot Complete Left (Final result)  Result time 07/29/24 10:48:53      Final result by Devika Peoples III, MD (07/29/24 10:48:53)                   Impression:      1. Low-grade chronic changes are present as described above.      Electronically signed by: Devika Peoples  Date:    07/29/2024  Time:    10:48               Narrative:    EXAMINATION:  STUDY: XR FOOT COMPLETE 3 VIEW LEFT    CLINICAL HISTORY  AND TECHNIQUE:  Pain    COMPARISON:  07/15/2024    FINDINGS:  Mild-to-moderate degenerative changes are noted involving the 1st metatarsal-phalangeal joint including mild to moderate joint space narrowing and subchondral sclerosis and minimal hypertrophic spur formation.  Minimal degenerative changes noted involving the intertarsal joints.  I see no definite fractures, dislocations, or other significant abnormalities.                                       X-Ray Knee Complete 4 or More Views Left (Final result)  Result time 07/29/24 10:47:46   Procedure changed from X-Ray Knee 3 View Left     Final result by Devika Peoples III, MD (07/29/24 10:47:46)                   Impression:      1. No significant abnormalities are noted.      Electronically signed by: Devika Peoples  Date:    07/29/2024  Time:    10:47               Narrative:    EXAMINATION:  STUDY: XR KNEE COMP 4 OR MORE VIEWS LEFT    CLINICAL HISTORY AND TECHNIQUE:  Pain    COMPARISON:  04/30/2019    FINDINGS:  I see no definite fractures, dislocations, or other significant abnormalities.                                       Medications - No data to display  Medical Decision Making  Amount and/or Complexity of Data Reviewed  Radiology: ordered.                                      Clinical Impression:  Final diagnoses:  [M79.606] Leg pain  [R26.9] Neurologic gait dysfunction (Primary)          ED Disposition Condition    Discharge Stable          ED Prescriptions    None       Follow-up Information    None          Austen Rene MD  07/29/24 5510

## 2024-08-06 ENCOUNTER — HOSPITAL ENCOUNTER (OUTPATIENT)
Dept: RADIOLOGY | Facility: HOSPITAL | Age: 74
Discharge: HOME | End: 2024-08-06
Attending: INTERNAL MEDICINE
Payer: OTHER GOVERNMENT

## 2024-08-06 DIAGNOSIS — M79.662 BILATERAL CALF PAIN: ICD-10-CM

## 2024-08-06 DIAGNOSIS — M79.661 BILATERAL CALF PAIN: ICD-10-CM

## 2024-08-06 PROCEDURE — 93925 LOWER EXTREMITY STUDY: CPT | Mod: TC

## 2024-09-06 DIAGNOSIS — Z87.891 PERSONAL HISTORY OF TOBACCO USE, PRESENTING HAZARDS TO HEALTH: Primary | ICD-10-CM

## 2024-09-13 ENCOUNTER — HOSPITAL ENCOUNTER (OUTPATIENT)
Dept: RADIOLOGY | Facility: HOSPITAL | Age: 74
Discharge: HOME | End: 2024-09-13
Attending: NEUROLOGICAL SURGERY
Payer: OTHER GOVERNMENT

## 2024-09-13 DIAGNOSIS — M54.16 LUMBAR RADICULOPATHY: ICD-10-CM

## 2024-09-13 PROCEDURE — 72148 MRI LUMBAR SPINE W/O DYE: CPT | Mod: TC

## 2024-09-19 ENCOUNTER — HOSPITAL ENCOUNTER (OUTPATIENT)
Dept: RADIOLOGY | Facility: HOSPITAL | Age: 74
Discharge: HOME | End: 2024-09-19
Attending: STUDENT IN AN ORGANIZED HEALTH CARE EDUCATION/TRAINING PROGRAM
Payer: OTHER GOVERNMENT

## 2024-09-19 DIAGNOSIS — Z87.891 PERSONAL HISTORY OF TOBACCO USE, PRESENTING HAZARDS TO HEALTH: ICD-10-CM

## 2024-09-19 PROCEDURE — 71271 CT THORAX LUNG CANCER SCR C-: CPT | Mod: TC

## 2024-11-01 ENCOUNTER — HOSPITAL ENCOUNTER (OUTPATIENT)
Dept: RADIOLOGY | Facility: HOSPITAL | Age: 74
Discharge: HOME | End: 2024-11-01
Attending: STUDENT IN AN ORGANIZED HEALTH CARE EDUCATION/TRAINING PROGRAM
Payer: OTHER GOVERNMENT

## 2024-11-01 DIAGNOSIS — R19.8 ASYMMETRY OF ABDOMINAL WALL: ICD-10-CM

## 2024-11-01 PROCEDURE — 76770 US EXAM ABDO BACK WALL COMP: CPT | Mod: TC

## 2025-01-31 ENCOUNTER — HOSPITAL ENCOUNTER (EMERGENCY)
Facility: HOSPITAL | Age: 75
Discharge: HOME | End: 2025-01-31
Payer: OTHER GOVERNMENT

## 2025-01-31 VITALS
OXYGEN SATURATION: 95 % | SYSTOLIC BLOOD PRESSURE: 133 MMHG | HEIGHT: 69 IN | TEMPERATURE: 98 F | BODY MASS INDEX: 28.88 KG/M2 | HEART RATE: 78 BPM | RESPIRATION RATE: 16 BRPM | DIASTOLIC BLOOD PRESSURE: 77 MMHG | WEIGHT: 195 LBS

## 2025-01-31 DIAGNOSIS — R19.7 DIARRHEA, UNSPECIFIED TYPE: Primary | ICD-10-CM

## 2025-01-31 LAB
ALBUMIN SERPL-MCNC: 3.9 G/DL (ref 3.4–5)
ALBUMIN/GLOB SERPL: 1.4 RATIO
ALP SERPL-CCNC: 132 UNIT/L (ref 50–144)
ALT SERPL-CCNC: 32 UNIT/L (ref 1–45)
ANION GAP SERPL CALC-SCNC: 5 MEQ/L (ref 2–13)
AST SERPL-CCNC: 35 UNIT/L (ref 17–59)
BASOPHILS # BLD AUTO: 0.03 X10(3)/MCL (ref 0.01–0.08)
BASOPHILS NFR BLD AUTO: 0.5 % (ref 0.1–1.2)
BILIRUB SERPL-MCNC: 0.3 MG/DL (ref 0–1)
BUN SERPL-MCNC: 11 MG/DL (ref 7–20)
CALCIUM SERPL-MCNC: 8.9 MG/DL (ref 8.4–10.2)
CHLORIDE SERPL-SCNC: 104 MMOL/L (ref 98–110)
CO2 SERPL-SCNC: 29 MMOL/L (ref 21–32)
CREAT SERPL-MCNC: 0.85 MG/DL (ref 0.66–1.25)
CREAT/UREA NIT SERPL: 13 (ref 12–20)
EOSINOPHIL # BLD AUTO: 0.17 X10(3)/MCL (ref 0.04–0.54)
EOSINOPHIL NFR BLD AUTO: 3 % (ref 0.7–7)
ERYTHROCYTE [DISTWIDTH] IN BLOOD BY AUTOMATED COUNT: 14.5 %
GFR SERPLBLD CREATININE-BSD FMLA CKD-EPI: >90 ML/MIN/1.73/M2
GLOBULIN SER-MCNC: 2.8 GM/DL (ref 2–3.9)
GLUCOSE SERPL-MCNC: 163 MG/DL (ref 70–115)
HCT VFR BLD AUTO: 34 % (ref 36–52)
HGB BLD-MCNC: 11.2 G/DL (ref 13–18)
IMM GRANULOCYTES # BLD AUTO: 0.04 X10(3)/MCL (ref 0–0.03)
IMM GRANULOCYTES NFR BLD AUTO: 0.7 % (ref 0–0.5)
LYMPHOCYTES # BLD AUTO: 0.92 X10(3)/MCL (ref 1.32–3.57)
LYMPHOCYTES NFR BLD AUTO: 16.5 % (ref 20–55)
MCH RBC QN AUTO: 29.7 PG (ref 27–34)
MCHC RBC AUTO-ENTMCNC: 32.9 G/DL (ref 31–37)
MCV RBC AUTO: 90.2 FL (ref 79–99)
MONOCYTES # BLD AUTO: 0.55 X10(3)/MCL (ref 0.3–0.82)
MONOCYTES NFR BLD AUTO: 9.8 % (ref 4.7–12.5)
NEUTROPHILS # BLD AUTO: 3.88 X10(3)/MCL (ref 1.78–5.38)
NEUTROPHILS NFR BLD AUTO: 69.5 % (ref 37–73)
PLATELET # BLD AUTO: 266 X10(3)/MCL (ref 140–371)
PMV BLD AUTO: 9.2 FL (ref 9.4–12.4)
POTASSIUM SERPL-SCNC: 4.3 MMOL/L (ref 3.5–5.1)
PROT SERPL-MCNC: 6.7 GM/DL (ref 6.3–8.2)
RBC # BLD AUTO: 3.77 X10(6)/MCL (ref 4–6)
SODIUM SERPL-SCNC: 138 MMOL/L (ref 136–145)
WBC # BLD AUTO: 5.59 X10(3)/MCL (ref 4–11.5)

## 2025-01-31 PROCEDURE — 99283 EMERGENCY DEPT VISIT LOW MDM: CPT

## 2025-01-31 PROCEDURE — 85025 COMPLETE CBC W/AUTO DIFF WBC: CPT | Performed by: NURSE PRACTITIONER

## 2025-01-31 PROCEDURE — 80053 COMPREHEN METABOLIC PANEL: CPT | Performed by: NURSE PRACTITIONER

## 2025-01-31 NOTE — ED PROVIDER NOTES
"Encounter Date: 1/31/2025       History     Chief Complaint   Patient presents with    Diarrhea     Diarrhea x 1 month - denies abd pain - has not seen PCP "one and a half" diarrhea BM's today     74-year-old male presents with diarrhea x1 month, denies abdominal pain.  Patient has not seen a PCP about this issue.  States diarrhea 1.5 times today    The history is provided by the patient. No  was used.     Review of patient's allergies indicates:   Allergen Reactions    Hydralazine analogues     Nifedipine      Past Medical History:   Diagnosis Date    Anxiety disorder, unspecified     Cancer     Diabetes mellitus     Hypertension     Major depressive disorder, single episode, unspecified     Mental disorder     Mixed hyperlipidemia      Past Surgical History:   Procedure Laterality Date    APPENDECTOMY      EYE SURGERY      FRACTURE SURGERY      HIP REPLACEMENT ARTHROPLASTY Left     SUBOCCIPITAL CRANIOTOMY Right 26 Jun 2019    Right suboccipital craniotomy for resection of ridht posterior fossa mass on 26 June 2019 /c Dr Thomas    TOTAL SHOULDER ARTHROPLASTY Right      Family History   Problem Relation Name Age of Onset    Depression Father      Suicide Father       Social History     Tobacco Use    Smoking status: Every Day     Current packs/day: 1.00     Types: Cigarettes    Smokeless tobacco: Never   Substance Use Topics    Alcohol use: Yes     Comment: occasion    Drug use: Not Currently     Review of Systems   Gastrointestinal:  Positive for diarrhea.   All other systems reviewed and are negative.      Physical Exam     Initial Vitals [01/31/25 1159]   BP Pulse Resp Temp SpO2   135/80 90 20 98.3 °F (36.8 °C) 95 %      MAP       --         Physical Exam    Nursing note and vitals reviewed.  Constitutional: He appears well-developed and well-nourished.   HENT:   Head: Normocephalic and atraumatic. Mouth/Throat: Mucous membranes are normal.   Eyes: EOM are normal. Pupils are equal, round, " and reactive to light.   Neck: Neck supple.   Normal range of motion.  Cardiovascular:  Normal rate, regular rhythm, normal heart sounds and intact distal pulses.           Pulmonary/Chest: Breath sounds normal.   Abdominal: Abdomen is soft. Bowel sounds are normal.   Musculoskeletal:         General: Normal range of motion.      Cervical back: Normal range of motion and neck supple.     Neurological: He is alert and oriented to person, place, and time. He has normal strength.   Skin: Skin is warm and dry. Capillary refill takes less than 2 seconds.   Psychiatric: He has a normal mood and affect. His behavior is normal. Judgment and thought content normal.         ED Course   Procedures  Labs Reviewed   COMPREHENSIVE METABOLIC PANEL - Abnormal       Result Value    Sodium 138      Potassium 4.3      Chloride 104      CO2 29      Glucose 163 (*)     Blood Urea Nitrogen 11      Creatinine 0.85      Calcium 8.9      Protein Total 6.7      Albumin 3.9      Globulin 2.8      Albumin/Globulin Ratio 1.4      Bilirubin Total 0.3            ALT 32      AST 35      eGFR >90      Anion Gap 5.0      BUN/Creatinine Ratio 13     CBC WITH DIFFERENTIAL - Abnormal    WBC 5.59      RBC 3.77 (*)     Hgb 11.2 (*)     Hct 34.0 (*)     MCV 90.2      MCH 29.7      MCHC 32.9      RDW 14.5      Platelet 266      MPV 9.2 (*)     Neut % 69.5      Lymph % 16.5 (*)     Mono % 9.8      Eos % 3.0      Basophil % 0.5      Lymph # 0.92 (*)     Neut # 3.88      Mono # 0.55      Eos # 0.17      Baso # 0.03      Imm Gran # 0.04 (*)     Imm Grans % 0.7 (*)    CBC W/ AUTO DIFFERENTIAL    Narrative:     The following orders were created for panel order CBC auto differential.  Procedure                               Abnormality         Status                     ---------                               -----------         ------                     CBC with Differential[[id number]]       Abnormal            Final result                 Please view  results for these tests on the individual orders.          Imaging Results    None          Medications - No data to display  Medical Decision Making  Problems Addressed:  Diarrhea, unspecified type: acute illness or injury    Amount and/or Complexity of Data Reviewed  Labs: ordered.                                      Clinical Impression:  Final diagnoses:  [R19.7] Diarrhea, unspecified type (Primary)          ED Disposition Condition    Discharge Stable          ED Prescriptions    None       Follow-up Information       Follow up With Specialties Details Why Contact Info    Nory Jackman MD Internal Medicine In 3 days If symptoms worsen [address] [place] [id number]  [phone]               Lindsay Forrester FNP  01/31/25 1310       Lindsay Forrester FNP  01/31/25 1316

## 2025-01-31 NOTE — Clinical Note
"Reggie Sexton" Lui was seen and treated in our emergency department on 1/31/2025.  He may return to work on 02/01/2025.       If you have any questions or concerns, please don't hesitate to call.      Lindsay Forrester FNP"

## 2025-01-31 NOTE — DISCHARGE INSTRUCTIONS
Please use stool collection kit and bring stool sample to lab when collected at home as discussed.

## 2025-06-03 ENCOUNTER — HOSPITAL ENCOUNTER (INPATIENT)
Facility: HOSPITAL | Age: 75
LOS: 1 days | Discharge: PSYCHIATRIC OR SUBSTANCE USE FACILITY | DRG: 918 | End: 2025-06-04
Attending: OTOLARYNGOLOGY | Admitting: FAMILY MEDICINE
Payer: MEDICARE

## 2025-06-03 DIAGNOSIS — Z00.8 MEDICAL CLEARANCE FOR PSYCHIATRIC ADMISSION: ICD-10-CM

## 2025-06-03 DIAGNOSIS — G80.8: ICD-10-CM

## 2025-06-03 DIAGNOSIS — T50.901A OVERDOSE: ICD-10-CM

## 2025-06-03 DIAGNOSIS — R47.01 EXPRESSIVE APHASIA SYNDROME: ICD-10-CM

## 2025-06-03 DIAGNOSIS — R45.851 SUICIDAL IDEATION: ICD-10-CM

## 2025-06-03 DIAGNOSIS — T14.91XA SUICIDAL BEHAVIOR WITH ATTEMPTED SELF-INJURY: Primary | ICD-10-CM

## 2025-06-03 PROBLEM — I95.2 HYPOTENSION DUE TO DRUGS: Status: ACTIVE | Noted: 2025-06-03

## 2025-06-03 LAB
ALBUMIN SERPL-MCNC: 4.3 G/DL (ref 3.4–5)
ALBUMIN/GLOB SERPL: 1.4 RATIO
ALP SERPL-CCNC: 128 UNIT/L (ref 50–144)
ALT SERPL-CCNC: 28 UNIT/L (ref 1–45)
AMPHET UR QL SCN: NEGATIVE
ANION GAP SERPL CALC-SCNC: 5 MEQ/L (ref 2–13)
APAP SERPL-MCNC: <10 UG/ML (ref 10–30)
AST SERPL-CCNC: 34 UNIT/L (ref 17–59)
BARBITURATE SCN PRESENT UR: NEGATIVE
BASOPHILS # BLD AUTO: 0.04 X10(3)/MCL (ref 0.01–0.08)
BASOPHILS NFR BLD AUTO: 0.6 % (ref 0.1–1.2)
BENZODIAZ UR QL SCN: NEGATIVE
BILIRUB SERPL-MCNC: 0.4 MG/DL (ref 0–1)
BILIRUB UR QL STRIP.AUTO: NEGATIVE
BNP BLD-MCNC: 164 PG/ML (ref 0–124.9)
BUN SERPL-MCNC: 18 MG/DL (ref 7–20)
CALCIUM SERPL-MCNC: 9.2 MG/DL (ref 8.4–10.2)
CANNABINOIDS UR QL SCN: NEGATIVE
CHLORIDE SERPL-SCNC: 104 MMOL/L (ref 98–110)
CLARITY UR: CLEAR
CO2 SERPL-SCNC: 27 MMOL/L (ref 21–32)
COCAINE UR QL SCN: NEGATIVE
COLOR UR AUTO: YELLOW
CREAT SERPL-MCNC: 0.98 MG/DL (ref 0.66–1.25)
CREAT/UREA NIT SERPL: 18 (ref 12–20)
EOSINOPHIL # BLD AUTO: 0.12 X10(3)/MCL (ref 0.04–0.54)
EOSINOPHIL NFR BLD AUTO: 1.9 % (ref 0.7–7)
ERYTHROCYTE [DISTWIDTH] IN BLOOD BY AUTOMATED COUNT: 14.9 %
ETHANOL BLD-MCNC: <0.01 G/DL
ETHANOL SERPL-MCNC: <10 MG/DL
FLUAV AG UPPER RESP QL IA.RAPID: NOT DETECTED
FLUBV AG UPPER RESP QL IA.RAPID: NOT DETECTED
GFR SERPLBLD CREATININE-BSD FMLA CKD-EPI: 82 ML/MIN/1.73/M2
GLOBULIN SER-MCNC: 3 GM/DL (ref 2–3.9)
GLUCOSE SERPL-MCNC: 135 MG/DL (ref 70–115)
GLUCOSE UR QL STRIP: NEGATIVE
HCT VFR BLD AUTO: 36.2 % (ref 36–52)
HGB BLD-MCNC: 12.2 G/DL (ref 13–18)
HGB UR QL STRIP: NEGATIVE
IMM GRANULOCYTES # BLD AUTO: 0.05 X10(3)/MCL (ref 0–0.03)
IMM GRANULOCYTES NFR BLD AUTO: 0.8 % (ref 0–0.5)
KETONES UR QL STRIP: NEGATIVE
LEUKOCYTE ESTERASE UR QL STRIP: NEGATIVE
LYMPHOCYTES # BLD AUTO: 1.21 X10(3)/MCL (ref 1.32–3.57)
LYMPHOCYTES NFR BLD AUTO: 18.8 % (ref 20–55)
MAGNESIUM SERPL-MCNC: 1.7 MG/DL (ref 1.8–2.4)
MCH RBC QN AUTO: 30.2 PG (ref 27–34)
MCHC RBC AUTO-ENTMCNC: 33.7 G/DL (ref 31–37)
MCV RBC AUTO: 89.6 FL (ref 79–99)
METHADONE UR QL SCN: NEGATIVE
MONOCYTES # BLD AUTO: 0.81 X10(3)/MCL (ref 0.3–0.82)
MONOCYTES NFR BLD AUTO: 12.6 % (ref 4.7–12.5)
NEUTROPHILS # BLD AUTO: 4.19 X10(3)/MCL (ref 1.78–5.38)
NEUTROPHILS NFR BLD AUTO: 65.3 % (ref 37–73)
NITRITE UR QL STRIP: NEGATIVE
NRBC BLD AUTO-RTO: 0 %
OPIATES UR QL SCN: NEGATIVE
PCP UR QL: NEGATIVE
PH UR STRIP: 7.5 [PH]
PH UR: 6 [PH] (ref 5–8)
PLATELET # BLD AUTO: 230 X10(3)/MCL (ref 140–371)
PMV BLD AUTO: 9.7 FL (ref 9.4–12.4)
POCT GLUCOSE: 111 MG/DL (ref 70–110)
POCT GLUCOSE: 81 MG/DL (ref 70–110)
POTASSIUM SERPL-SCNC: 3.9 MMOL/L (ref 3.5–5.1)
PROT SERPL-MCNC: 7.3 GM/DL (ref 6.3–8.2)
PROT UR QL STRIP: NEGATIVE
RBC # BLD AUTO: 4.04 X10(6)/MCL (ref 4–6)
SALICYLATES SERPL-MCNC: <1 MG/DL
SARS-COV-2 RNA RESP QL NAA+PROBE: NEGATIVE
SODIUM SERPL-SCNC: 136 MMOL/L (ref 136–145)
SP GR UR STRIP.AUTO: 1.01 (ref 1–1.03)
TROPONIN I SERPL-MCNC: <0.012 NG/ML (ref 0–0.03)
UROBILINOGEN UR STRIP-ACNC: 0.2
WBC # BLD AUTO: 6.42 X10(3)/MCL (ref 4–11.5)

## 2025-06-03 PROCEDURE — 80143 DRUG ASSAY ACETAMINOPHEN: CPT | Performed by: OTOLARYNGOLOGY

## 2025-06-03 PROCEDURE — 99285 EMERGENCY DEPT VISIT HI MDM: CPT | Mod: 25

## 2025-06-03 PROCEDURE — 27000221 HC OXYGEN, UP TO 24 HOURS

## 2025-06-03 PROCEDURE — 96361 HYDRATE IV INFUSION ADD-ON: CPT

## 2025-06-03 PROCEDURE — 82077 ASSAY SPEC XCP UR&BREATH IA: CPT | Performed by: OTOLARYNGOLOGY

## 2025-06-03 PROCEDURE — 0240U COVID/FLU A&B PCR: CPT | Performed by: OTOLARYNGOLOGY

## 2025-06-03 PROCEDURE — 25000003 PHARM REV CODE 250: Performed by: OTOLARYNGOLOGY

## 2025-06-03 PROCEDURE — 99900035 HC TECH TIME PER 15 MIN (STAT)

## 2025-06-03 PROCEDURE — 93005 ELECTROCARDIOGRAM TRACING: CPT

## 2025-06-03 PROCEDURE — 83880 ASSAY OF NATRIURETIC PEPTIDE: CPT | Performed by: OTOLARYNGOLOGY

## 2025-06-03 PROCEDURE — 25000003 PHARM REV CODE 250: Performed by: FAMILY MEDICINE

## 2025-06-03 PROCEDURE — 80179 DRUG ASSAY SALICYLATE: CPT | Performed by: OTOLARYNGOLOGY

## 2025-06-03 PROCEDURE — 84484 ASSAY OF TROPONIN QUANT: CPT | Performed by: OTOLARYNGOLOGY

## 2025-06-03 PROCEDURE — 80053 COMPREHEN METABOLIC PANEL: CPT | Performed by: OTOLARYNGOLOGY

## 2025-06-03 PROCEDURE — 85025 COMPLETE CBC W/AUTO DIFF WBC: CPT | Performed by: OTOLARYNGOLOGY

## 2025-06-03 PROCEDURE — 93010 ELECTROCARDIOGRAM REPORT: CPT | Mod: ,,, | Performed by: INTERNAL MEDICINE

## 2025-06-03 PROCEDURE — 80307 DRUG TEST PRSMV CHEM ANLYZR: CPT | Performed by: OTOLARYNGOLOGY

## 2025-06-03 PROCEDURE — 96360 HYDRATION IV INFUSION INIT: CPT

## 2025-06-03 PROCEDURE — 20000000 HC ICU ROOM

## 2025-06-03 PROCEDURE — 94799 UNLISTED PULMONARY SVC/PX: CPT

## 2025-06-03 PROCEDURE — 63600175 PHARM REV CODE 636 W HCPCS: Performed by: OTOLARYNGOLOGY

## 2025-06-03 PROCEDURE — 94761 N-INVAS EAR/PLS OXIMETRY MLT: CPT

## 2025-06-03 PROCEDURE — 81003 URINALYSIS AUTO W/O SCOPE: CPT | Mod: 59 | Performed by: OTOLARYNGOLOGY

## 2025-06-03 PROCEDURE — 83735 ASSAY OF MAGNESIUM: CPT | Performed by: FAMILY MEDICINE

## 2025-06-03 RX ORDER — IBUPROFEN 200 MG
24 TABLET ORAL
Status: DISCONTINUED | OUTPATIENT
Start: 2025-06-03 | End: 2025-06-04 | Stop reason: HOSPADM

## 2025-06-03 RX ORDER — NOREPINEPHRINE BITARTRATE/D5W 4MG/250ML
0-3 PLASTIC BAG, INJECTION (ML) INTRAVENOUS CONTINUOUS
Status: DISCONTINUED | OUTPATIENT
Start: 2025-06-03 | End: 2025-06-03

## 2025-06-03 RX ORDER — ENOXAPARIN SODIUM 100 MG/ML
40 INJECTION SUBCUTANEOUS EVERY 24 HOURS
Status: DISCONTINUED | OUTPATIENT
Start: 2025-06-04 | End: 2025-06-04 | Stop reason: HOSPADM

## 2025-06-03 RX ORDER — ONDANSETRON HYDROCHLORIDE 2 MG/ML
4 INJECTION, SOLUTION INTRAVENOUS EVERY 8 HOURS PRN
Status: DISCONTINUED | OUTPATIENT
Start: 2025-06-03 | End: 2025-06-04 | Stop reason: HOSPADM

## 2025-06-03 RX ORDER — SODIUM CHLORIDE 0.9 % (FLUSH) 0.9 %
10 SYRINGE (ML) INJECTION
Status: DISCONTINUED | OUTPATIENT
Start: 2025-06-03 | End: 2025-06-04 | Stop reason: HOSPADM

## 2025-06-03 RX ORDER — GLUCAGON 1 MG
1 KIT INJECTION
Status: DISCONTINUED | OUTPATIENT
Start: 2025-06-03 | End: 2025-06-04 | Stop reason: HOSPADM

## 2025-06-03 RX ORDER — FAMOTIDINE 10 MG/ML
20 INJECTION, SOLUTION INTRAVENOUS EVERY 12 HOURS
Status: DISCONTINUED | OUTPATIENT
Start: 2025-06-03 | End: 2025-06-04 | Stop reason: HOSPADM

## 2025-06-03 RX ORDER — GABAPENTIN 800 MG/1
800 TABLET ORAL 3 TIMES DAILY
COMMUNITY

## 2025-06-03 RX ORDER — SODIUM CHLORIDE 9 MG/ML
INJECTION, SOLUTION INTRAVENOUS CONTINUOUS
Status: DISCONTINUED | OUTPATIENT
Start: 2025-06-03 | End: 2025-06-04 | Stop reason: HOSPADM

## 2025-06-03 RX ORDER — IBUPROFEN 200 MG
16 TABLET ORAL
Status: DISCONTINUED | OUTPATIENT
Start: 2025-06-03 | End: 2025-06-04 | Stop reason: HOSPADM

## 2025-06-03 RX ORDER — MUPIROCIN 20 MG/G
OINTMENT TOPICAL 2 TIMES DAILY
Status: DISCONTINUED | OUTPATIENT
Start: 2025-06-03 | End: 2025-06-04 | Stop reason: HOSPADM

## 2025-06-03 RX ORDER — ACETAMINOPHEN 650 MG/1
650 SUPPOSITORY RECTAL EVERY 4 HOURS PRN
Status: DISCONTINUED | OUTPATIENT
Start: 2025-06-03 | End: 2025-06-04 | Stop reason: HOSPADM

## 2025-06-03 RX ORDER — INSULIN ASPART 100 [IU]/ML
0-5 INJECTION, SOLUTION INTRAVENOUS; SUBCUTANEOUS
Status: DISCONTINUED | OUTPATIENT
Start: 2025-06-03 | End: 2025-06-04 | Stop reason: HOSPADM

## 2025-06-03 RX ADMIN — SODIUM CHLORIDE 1000 ML: 0.9 INJECTION, SOLUTION INTRAVENOUS at 02:06

## 2025-06-03 RX ADMIN — SODIUM CHLORIDE: 0.9 INJECTION, SOLUTION INTRAVENOUS at 09:06

## 2025-06-03 RX ADMIN — FAMOTIDINE 20 MG: 10 INJECTION, SOLUTION INTRAVENOUS at 09:06

## 2025-06-03 RX ADMIN — SODIUM CHLORIDE 1000 ML: 0.9 INJECTION, SOLUTION INTRAVENOUS at 12:06

## 2025-06-03 RX ADMIN — ACTIVATED CHARCOAL 25 G: 208 SUSPENSION ORAL at 01:06

## 2025-06-03 RX ADMIN — SODIUM CHLORIDE: 0.9 INJECTION, SOLUTION INTRAVENOUS at 04:06

## 2025-06-03 RX ADMIN — MUPIROCIN: 20 OINTMENT TOPICAL at 09:06

## 2025-06-03 NOTE — H&P
" [facility]  History & Physical    Patient Name: Reggie Poe  MRN: [id number]  Patient Class: IP- Inpatient  Admission Date: 6/3/2025  Attending Physician: Elisha Dailey MD  Primary Care Provider: Nory Jackman MD         Patient information was obtained from patient and ER records.     Subjective:     Principal Problem:Hypotension due to drugs    Chief Complaint:   Chief Complaint   Patient presents with    Ingestion     Pt BIBA unit 47, pt was suicidal with intent after visit from  wife, took 4 days worth of prescription medicine at 11am today, pt states he "wants to take the big sleep". Denies HI, -AH/VH. Medications brought with patient for identification. Pt in NAD and VSS at this time. .        HPI:     Ingestion        Pt BIBA unit 47, pt was suicidal with intent after visit from  wife, took 4 days worth of prescription medicine at 11am today, pt states he "wants to take the big sleep". Denies HI, -AH/VH. Medications brought with patient for identification. Pt in NAD and VSS at this time. .      THE PATIENT IS A PARAPLEGIC  MALE WHO IS MORE DEPRESSED THEN NORMAL AND TOOK ALL OF HIS MEDICINE FOR A 5 DAY SUPPLY AT ONE TIME TO TRY TO COMMIT SUICIDE AND "TAKE THE BIG SLEEP". HE HAS NEVER TRIED TO COMMIT SUICIDE BEFORE. HE IS HAVING ISSUES WITH HIS WIFE AND THEY ARE . HE HAS A DAILY SITTER THAT COMES TO HIS HOUSE. HE HAS A HISTORY OF DEPRESSION. HE DENIES DRUGS. HE DRINKS WHISKEY ON WEEKENDS. HE DENIES AUDITORY OR VISUAL HALLUCINATIONS. HE WAS TRANSPORTED HERE BY AMBULANCE. POISON CONTROL CONTACTED AND RECOMMENDATIONS WERE MADE BY POISON CONTROL. THE PATIENT IS IN NO ACUTE DISTRESS, VSS. HE IS AWAKE AND ALERT AND ORIENTED X 3, GCS 15, COOPERATIVE WITH EXAM AND QUESTIONING.     THE PATIENT IS WHEELCHAIR BOUND. HE STATES HE HAD A BENIGN BRAIN TUMOR REMOVED 6 YEARS AGO AND IS NOW PARALYZED AND HAS EXPRESSIVE APHASIA SINCE " "THE SURGERY.     MEDICINE HE TOOK INCLUDES PROZAC, FLOMAX, GABAPENTIN, EFFEXOR, GEODON, BUSPAR, CELEBREX, ATORVASTATIN, METFORMIN, LISINOPRIL, COREG, ZYRTEC, PRIMIDONE, LASIX AND PROSCAR. POISON CONTROL CONTACTED AND RECOMMENDATIONS GIVEN INCLUDING ACTIVATED CHARCOAL AND IVF BOLUS.    In ER pt BP was low, given mutliple ivf boluses, however remained low, planned to start on levophed and will admit to ICU for pressure support and monitoring.  He received ivf and charcoal in the ER per poison control recommendations.  On arrival to ICU he is awake and alert. HE says his ex wife was putting him on a budget and handling all his business, he has been depressed and "didn't want to live like that"  He is admitted from ER with PEC, BP has not been low and levophed never had to be started.    BP Readings from Last 3 Encounters:   06/03/25 109/67   01/31/25 133/77   07/29/24 (!) 145/83                  Past Medical History:   Diagnosis Date    Anxiety disorder, unspecified     Cancer     Diabetes mellitus     Hypertension     Major depressive disorder, single episode, unspecified     Mental disorder     Mixed hyperlipidemia        Past Surgical History:   Procedure Laterality Date    APPENDECTOMY      EYE SURGERY      FRACTURE SURGERY      HIP REPLACEMENT ARTHROPLASTY Left     SUBOCCIPITAL CRANIOTOMY Right 26 Jun 2019    Right suboccipital craniotomy for resection of ridht posterior fossa mass on 26 June 2019 /c Dr Thomas    TOTAL SHOULDER ARTHROPLASTY Right        Review of patient's allergies indicates:   Allergen Reactions    Hydralazine analogues     Nifedipine        No current facility-administered medications on file prior to encounter.     Current Outpatient Medications on File Prior to Encounter   Medication Sig    tamsulosin (FLOMAX) 0.4 mg Cap     amLODIPine (NORVASC) 5 MG tablet     atorvastatin (LIPITOR) 40 MG tablet Take 40 mg by mouth once daily.    b complex vitamins capsule Take 1 capsule by mouth.    " colchicine (COLCRYS) 0.6 mg tablet Take 1 tablet (0.6 mg total) by mouth once daily.    cyclobenzaprine (FLEXERIL) 10 MG tablet Take 10 mg by mouth 3 (three) times daily as needed for Muscle spasms.    gabapentin (NEURONTIN) 800 MG tablet Take 800 mg by mouth 3 (three) times daily.    HYDROcodone-acetaminophen (NORCO) 5-325 mg per tablet Take 1 tablet by mouth every 6 (six) hours as needed for Pain.    megestrol (MEGACE) 40 MG Tab     pantoprazole (PROTONIX) 40 MG tablet     venlafaxine (EFFEXOR-XR) 75 MG 24 hr capsule     zolpidem (AMBIEN) 10 mg Tab      Family History       Problem Relation (Age of Onset)    Depression Father    Suicide Father          Tobacco Use    Smoking status: Every Day     Current packs/day: 0.50     Types: Cigarettes    Smokeless tobacco: Never   Substance and Sexual Activity    Alcohol use: Yes     Comment: occasion    Drug use: Not Currently    Sexual activity: Not on file     Review of Systems   Constitutional:  Negative for appetite change, fatigue and fever.   Respiratory:  Negative for cough, shortness of breath and wheezing.    Cardiovascular:  Negative for chest pain and leg swelling.   Gastrointestinal:  Negative for abdominal distention, abdominal pain, constipation, diarrhea, nausea and vomiting.   Skin:  Negative for color change, pallor, rash and wound.   Neurological:  Negative for tremors, syncope and headaches.   Psychiatric/Behavioral:  Negative for agitation and behavioral problems.      Objective:     Vital Signs (Most Recent):  Temp: 97.1 °F (36.2 °C) (06/03/25 1615)  Pulse: 75 (06/03/25 1715)  Resp: 17 (06/03/25 1715)  BP: 108/65 (06/03/25 1715)  SpO2: (!) 90 % (06/03/25 1715) Vital Signs (24h Range):  Temp:  [97.1 °F (36.2 °C)-98.5 °F (36.9 °C)] 97.1 °F (36.2 °C)  Pulse:  [71-81] 75  Resp:  [13-29] 17  SpO2:  [90 %-97 %] 90 %  BP: ()/(56-78) 108/65     Weight: 92.5 kg (203 lb 14.8 oz)  Body mass index is 31.01 kg/m².     Physical Exam  Vitals and nursing note  "reviewed. Exam conducted with a chaperone present.   Constitutional:       General: He is not in acute distress.     Appearance: Normal appearance. He is normal weight. He is not ill-appearing.   HENT:      Head: Normocephalic and atraumatic.      Nose: Nose normal.   Eyes:      General: No scleral icterus.     Conjunctiva/sclera: Conjunctivae normal.   Cardiovascular:      Rate and Rhythm: Normal rate and regular rhythm.      Pulses: Normal pulses.      Heart sounds: Normal heart sounds.   Pulmonary:      Effort: Pulmonary effort is normal.      Breath sounds: Normal breath sounds.   Abdominal:      General: Abdomen is flat. Bowel sounds are normal.      Palpations: Abdomen is soft.   Musculoskeletal:      Right lower leg: No edema.      Left lower leg: No edema.   Skin:     General: Skin is warm and dry.      Findings: No erythema or rash.   Neurological:      General: No focal deficit present.      Mental Status: He is alert and oriented to person, place, and time.   Psychiatric:         Mood and Affect: Mood normal.         Behavior: Behavior normal.         Thought Content: Thought content normal.                Significant Labs: All pertinent labs within the past 24 hours have been reviewed.  CBC:   Recent Labs   Lab 06/03/25  1250   WBC 6.42   HGB 12.2*   HCT 36.2        CMP:   Recent Labs   Lab 06/03/25  1250      K 3.9      CO2 27   *   BUN 18   CREATININE 0.98   CALCIUM 9.2   PROT 7.3   ALBUMIN 4.3   BILITOT 0.4   ALKPHOS 128   AST 34   ALT 28     Cardiac Markers: No results for input(s): "CKMB", "MYOGLOBIN", "BNP", "TROPISTAT" in the last 48 hours.    Significant Imaging: I have reviewed all pertinent imaging results/findings within the past 24 hours.  Assessment/Plan:     Assessment & Plan  Hypotension due to drugs  BP on low side, needs observation due to hypotension and overdose  Admit to ICU for possible levophed and 1:1    Overdose  Multiple medications overdosed   Thinks he " took 3-5 days of medications   PROZAC, FLOMAX, GABAPENTIN, EFFEXOR, GEODON, BUSPAR, CELEBREX, ATORVASTATIN, METFORMIN, LISINOPRIL, COREG, ZYRTEC, PRIMIDONE, LASIX AND PROSCAR.    Medical clearance for psychiatric admission  Will admit to ICU overnight due to polysubstance overdose and will release in am if stable BP    Suicidal ideation  PEC in place   1:1    Suicidal behavior with attempted self-injury  1:1 sitter in place    Expressive aphasia syndrome  Cheonic     Paraplegic cerebral palsy  chronic    VTE Risk Mitigation (From admission, onward)           Ordered     enoxaparin injection 40 mg  Every 24 hours         06/03/25 1750     IP VTE HIGH RISK PATIENT  Once         06/03/25 1520     Place sequential compression device  Until discontinued         06/03/25 1520                             Patient Screened for food insecurity, housing instability, transportation needs, utility difficulties, and interpersonal safety.  No needs identified  Pt will be admitted to inpatient status, anticipate will need 2 midnight stay to resolve her medical issues and have appropriate treatments.  Home medications were received by me and reconciled based on the consideration of mental status, ability to tolerate oral medications and side effects. I will reassess and resume additional home medications as clinically indicated.      Critical care time spent on the evaluation and treatment of severe organ dysfunction, review of pertinent labs and imaging studies, discussions with consulting providers and discussions with patient/family 38 minutes      Elisha Dailey MD  Department of Hospital Medicine  [facility]

## 2025-06-03 NOTE — ASSESSMENT & PLAN NOTE
Multiple medications overdosed   Thinks he took 3-5 days of medications   PROZAC, FLOMAX, GABAPENTIN, EFFEXOR, GEODON, BUSPAR, CELEBREX, ATORVASTATIN, METFORMIN, LISINOPRIL, COREG, ZYRTEC, PRIMIDONE, LASIX AND PROSCAR.

## 2025-06-03 NOTE — ED PROVIDER NOTES
"Encounter Date: 6/3/2025       History     Chief Complaint   Patient presents with    Ingestion     Pt BIBA unit 47, pt was suicidal with intent after visit from  wife, took 4 days worth of prescription medicine at 11am today, pt states he "wants to take the big sleep". Denies HI, -AH/VH. Medications brought with patient for identification. Pt in NAD and VSS at this time. .     THE PATIENT IS A PARAPLEGIC  MALE WHO IS MORE DEPRESSED THEN NORMAL AND TOOK ALL OF HIS MEDICINE FOR A 5 DAY SUPPLY AT ONE TIME TO TRY TO COMMIT SUICIDE AND "TAKE THE BIG SLEEP". HE HAS NEVER TRIED TO COMMIT SUICIDE BEFORE. HE IS HAVING ISSUES WITH HIS WIFE AND THEY ARE . HE HAS A DAILY SITTER THAT COMES TO HIS HOUSE. HE HAS A HISTORY OF DEPRESSION. HE DENIES DRUGS. HE DRINKS WHISKEY ON WEEKENDS. HE DENIES AUDITORY OR VISUAL HALLUCINATIONS. HE WAS TRANSPORTED HERE BY AMBULANCE. POISON CONTROL CONTACTED AND RECOMMENDATIONS WERE MADE BY POISON CONTROL. THE PATIENT IS IN NO ACUTE DISTRESS, VSS. HE IS AWAKE AND ALERT AND ORIENTED X 3, GCS 15, COOPERATIVE WITH EXAM AND QUESTIONING.    THE PATIENT IS WHEELCHAIR BOUND. HE STATES HE HAD A BENIGN BRAIN TUMOR REMOVED 6 YEARS AGO AND IS NOW PARALYZED AND HAS EXPRESSIVE APHASIA SINCE THE SURGERY.    MEDICINE HE TOOK INCLUDES PROZAC, FLOMAX, GABAPENTIN, EFFEXOR, GEODON, BUSPAR, CELEBREX, ATORVASTATIN, METFORMIN, LISINOPRIL, COREG, ZYRTEC, PRIMIDONE, LASIX AND PROSCAR. POISON CONTROL CONTACTED AND RECOMMENDATIONS GIVEN INCLUDING ACTIVATED CHARCOAL AND IVF BOLUS.        Review of patient's allergies indicates:   Allergen Reactions    Hydralazine analogues     Nifedipine      Past Medical History:   Diagnosis Date    Anxiety disorder, unspecified     Cancer     Diabetes mellitus     Hypertension     Major depressive disorder, single episode, unspecified     Mental disorder     Mixed hyperlipidemia      Past Surgical History:   Procedure Laterality Date    APPENDECTOMY      " EYE SURGERY      FRACTURE SURGERY      HIP REPLACEMENT ARTHROPLASTY Left     SUBOCCIPITAL CRANIOTOMY Right 26 Jun 2019    Right suboccipital craniotomy for resection of ridht posterior fossa mass on 26 June 2019 /c Dr Thomas    TOTAL SHOULDER ARTHROPLASTY Right      Family History   Problem Relation Name Age of Onset    Depression Father      Suicide Father       Social History[1]  Review of Systems   Psychiatric/Behavioral:  Positive for behavioral problems, decreased concentration, dysphoric mood, self-injury, sleep disturbance and suicidal ideas. Negative for agitation, confusion and hallucinations. The patient is nervous/anxious. The patient is not hyperactive.    All other systems reviewed and are negative.      Physical Exam     Initial Vitals [06/03/25 1234]   BP Pulse Resp Temp SpO2   122/76 81 20 98.5 °F (36.9 °C) (!) 93 %      MAP       --         Physical Exam    Constitutional: He appears well-developed and well-nourished. No distress.   HENT:   Head: Normocephalic and atraumatic.   Eyes: Conjunctivae and EOM are normal. Pupils are equal, round, and reactive to light.   Neck: Neck supple.   Normal range of motion.  Cardiovascular:  Normal rate, regular rhythm and normal heart sounds.           Pulmonary/Chest: Breath sounds normal.   Abdominal: Abdomen is soft. Bowel sounds are normal.   Musculoskeletal:      Cervical back: Normal range of motion and neck supple.      Comments: PARALYSIS LOWER EXTREMITIES      Neurological: He is alert and oriented to person, place, and time. He has normal strength. GCS score is 15. GCS eye subscore is 4. GCS verbal subscore is 5. GCS motor subscore is 6.   Skin: Skin is warm and dry.   Psychiatric:   SAD AND DEPRESSED, FLAT AFFECT, DYSPHORIC MOOD         ED Course   Procedures  Labs Reviewed   CBC WITH DIFFERENTIAL - Abnormal       Result Value    WBC 6.42      RBC 4.04      Hgb 12.2 (*)     Hct 36.2      MCV 89.6      MCH 30.2      MCHC 33.7      RDW 14.9       Platelet 230      MPV 9.7      Neut % 65.3      Lymph % 18.8 (*)     Mono % 12.6 (*)     Eos % 1.9      Basophil % 0.6      Lymph # 1.21 (*)     Neut # 4.19      Mono # 0.81      Eos # 0.12      Baso # 0.04      Imm Gran # 0.05 (*)     Imm Grans % 0.8 (*)     NRBC% 0.0     ACETAMINOPHEN LEVEL - Abnormal    Acetaminophen Level <10.0 (*)    COMPREHENSIVE METABOLIC PANEL - Abnormal    Sodium 136      Potassium 3.9      Chloride 104      CO2 27      Glucose 135 (*)     Blood Urea Nitrogen 18      Creatinine 0.98      Calcium 9.2      Protein Total 7.3      Albumin 4.3      Globulin 3.0      Albumin/Globulin Ratio 1.4      Bilirubin Total 0.4            ALT 28      AST 34      eGFR 82      Anion Gap 5.0      BUN/Creatinine Ratio 18     NT-PRO NATRIURETIC PEPTIDE - Abnormal    ProBNP 164.0 (*)    ALCOHOL,MEDICAL (ETHANOL) - Normal    Ethanol Level <10.0      Alcohol, Legal Level <0.010     DRUG SCREEN, URINE (BEAKER) - Normal    Amphetamines, Urine Negative      Barbiturates, Urine Negative      Benzodiazepine, Urine Negative      Cannabinoids, Urine Negative      Cocaine, Urine Negative      Opiates, Urine Negative      Phencyclidine, Urine Negative      Methadone, Urine Negative      pH, Urine 6.0      Narrative:     Cut off concentrations:    Amphetamines - 1000 ng/ml  Barbiturates - 200 ng/ml  Benzodiazepine - 200 ng/ml  Cannabinoids (THC) - 50 ng/ml  Cocaine - 300 ng/ml  Fentanyl - 1.0 ng/ml  MDMA - 500 ng/ml  Opiates - 300 ng/ml   Phencyclidine (PCP) - 25 ng/ml  Methadone - 300 ng/ml      False negatives may result form substances such as bleach added to urine.  False positives may result for the presence of a substance with similar chemical structure to the drug or its metabolite.    This test provides only a PRELIMINARY analytical test result. A more specific alternate chemical method must be used in order to obtain a confirmed analytical result. Gas chromatography/mass spectrometry (GC/MS) is the preferred  confirmatory method. Other chemical confirmation methods are available. Clinical consideration and professional judgement should be applied to any drug of abuse test result, particularly when preliminary positive results are used.    Positive results will be confirmed only at the physicians request. Unconfirmed screening results are to be used only for medical purposes (treatment).          SALICYLATE LEVEL - Normal    Salicylate Level <1.0     COVID/FLU A&B PCR - Normal    Influenza A PCR Not Detected      Influenza B PCR Not Detected      SARS-CoV-2 PCR Negative      Narrative:     The Xpert Xpress SARS-CoV-2/FLU/RSV plus is a rapid, multiplexed real-time PCR test intended for the simultaneous qualitative detection and differentiation of SARS-CoV-2, Influenza A, Influenza B, and respiratory syncytial virus (RSV) viral RNA in either nasopharyngeal swab or nasal swab specimens.         TROPONIN I - Normal    Troponin-I <0.012     CBC W/ AUTO DIFFERENTIAL    Narrative:     The following orders were created for panel order CBC auto differential.  Procedure                               Abnormality         Status                     ---------                               -----------         ------                     CBC with Differential[[id number]]       Abnormal            Final result                 Please view results for these tests on the individual orders.   URINALYSIS, REFLEX TO URINE CULTURE     EKG Readings: (Independently Interpreted)   Initial Reading: No STEMI. Rhythm: Normal Sinus Rhythm. Conduction: 1st Degree AV Block. ST Segments: Normal ST Segments. T Waves: Normal. Axis: Normal. Clinical Impression: Normal Sinus Rhythm       Imaging Results    None          Medications   sodium chloride 0.9% flush 10 mL (has no administration in time range)   0.9% NaCl infusion (has no administration in time range)   acetaminophen suppository 650 mg (has no administration in time range)   famotidine (PF) injection  20 mg (has no administration in time range)   ondansetron injection 4 mg (has no administration in time range)   NORepinephrine 4 mg in dextrose 5% 250 mL infusion (premix) (has no administration in time range)   activated charcoal-aqua 25 gram/120 mL suspension 25 g (25 g Oral Given 6/3/25 1300)   sodium chloride 0.9% bolus 1,000 mL 1,000 mL (0 mLs Intravenous Stopped 6/3/25 1349)   sodium chloride 0.9% bolus 1,000 mL 1,000 mL (1,000 mLs Intravenous New Bag 6/3/25 1421)   sodium chloride 0.9% bolus 1,000 mL 1,000 mL (1,000 mLs Intravenous New Bag 6/3/25 1430)     Medical Decision Making  SUICIDAL ATTEMPT WITH MAJOR DEPRESSIVE DISORDER. PLAN FOR PSYCH HOLD AND TRANSFER AFTER STABILIZATION.    Amount and/or Complexity of Data Reviewed  Labs: ordered.    Risk  OTC drugs.  Prescription drug management.  Decision regarding hospitalization.                                      Clinical Impression:  Final diagnoses:  [T50.901A] Overdose  [Z00.8] Medical clearance for psychiatric admission  [R45.851] Suicidal ideation  [T14.91XA] Suicidal behavior with attempted self-injury (Primary)  [G80.8] Paraplegic cerebral palsy  [R47.01] Expressive aphasia syndrome          ED Disposition Condition    Admit                       Isabella Ndiaye MD  06/03/25 1530       Isabella Ndiaye MD  06/03/25 1530         [1]   Social History  Tobacco Use    Smoking status: Every Day     Current packs/day: 0.50     Types: Cigarettes    Smokeless tobacco: Never   Substance Use Topics    Alcohol use: Yes     Comment: occasion    Drug use: Not Currently        Isabella Ndiaye MD  06/03/25 1531

## 2025-06-03 NOTE — ASSESSMENT & PLAN NOTE
BP on low side, needs observation due to hypotension and overdose  Admit to ICU for possible levophed and 1:1

## 2025-06-03 NOTE — PLAN OF CARE
Problem: Violence Risk or Actual  Goal: Anger and Impulse Control  Outcome: Progressing     Problem: Suicide Risk  Goal: Absence of Self-Harm  Outcome: Progressing     Problem: Adult Inpatient Plan of Care  Goal: Plan of Care Review  Outcome: Progressing  Goal: Patient-Specific Goal (Individualized)  Outcome: Progressing  Goal: Absence of Hospital-Acquired Illness or Injury  Outcome: Progressing  Goal: Optimal Comfort and Wellbeing  Outcome: Progressing  Goal: Readiness for Transition of Care  Outcome: Progressing

## 2025-06-03 NOTE — HPI
"  Ingestion        Pt BIBA unit 47, pt was suicidal with intent after visit from  wife, took 4 days worth of prescription medicine at 11am today, pt states he "wants to take the big sleep". Denies HI, -AH/VH. Medications brought with patient for identification. Pt in NAD and VSS at this time. .      THE PATIENT IS A PARAPLEGIC  MALE WHO IS MORE DEPRESSED THEN NORMAL AND TOOK ALL OF HIS MEDICINE FOR A 5 DAY SUPPLY AT ONE TIME TO TRY TO COMMIT SUICIDE AND "TAKE THE BIG SLEEP". HE HAS NEVER TRIED TO COMMIT SUICIDE BEFORE. HE IS HAVING ISSUES WITH HIS WIFE AND THEY ARE . HE HAS A DAILY SITTER THAT COMES TO HIS HOUSE. HE HAS A HISTORY OF DEPRESSION. HE DENIES DRUGS. HE DRINKS WHISKEY ON WEEKENDS. HE DENIES AUDITORY OR VISUAL HALLUCINATIONS. HE WAS TRANSPORTED HERE BY AMBULANCE. POISON CONTROL CONTACTED AND RECOMMENDATIONS WERE MADE BY POISON CONTROL. THE PATIENT IS IN NO ACUTE DISTRESS, VSS. HE IS AWAKE AND ALERT AND ORIENTED X 3, GCS 15, COOPERATIVE WITH EXAM AND QUESTIONING.     THE PATIENT IS WHEELCHAIR BOUND. HE STATES HE HAD A BENIGN BRAIN TUMOR REMOVED 6 YEARS AGO AND IS NOW PARALYZED AND HAS EXPRESSIVE APHASIA SINCE THE SURGERY.     MEDICINE HE TOOK INCLUDES PROZAC, FLOMAX, GABAPENTIN, EFFEXOR, GEODON, BUSPAR, CELEBREX, ATORVASTATIN, METFORMIN, LISINOPRIL, COREG, ZYRTEC, PRIMIDONE, LASIX AND PROSCAR. POISON CONTROL CONTACTED AND RECOMMENDATIONS GIVEN INCLUDING ACTIVATED CHARCOAL AND IVF BOLUS.    In ER pt BP was low, given mutliple ivf boluses, however remained low, planned to start on levophed and will admit to ICU for pressure support and monitoring.  He received ivf and charcoal in the ER per poison control recommendations.  On arrival to ICU he is awake and alert. 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    BP Readings from Last 3 Encounters:   06/03/25 109/67   01/31/25 133/77   07/29/24 (!) 145/83

## 2025-06-03 NOTE — SUBJECTIVE & OBJECTIVE
Past Medical History:   Diagnosis Date    Anxiety disorder, unspecified     Cancer     Diabetes mellitus     Hypertension     Major depressive disorder, single episode, unspecified     Mental disorder     Mixed hyperlipidemia        Past Surgical History:   Procedure Laterality Date    APPENDECTOMY      EYE SURGERY      FRACTURE SURGERY      HIP REPLACEMENT ARTHROPLASTY Left     SUBOCCIPITAL CRANIOTOMY Right 26 Jun 2019    Right suboccipital craniotomy for resection of ridht posterior fossa mass on 26 June 2019 /c Dr Thomas    TOTAL SHOULDER ARTHROPLASTY Right        Review of patient's allergies indicates:   Allergen Reactions    Hydralazine analogues     Nifedipine        No current facility-administered medications on file prior to encounter.     Current Outpatient Medications on File Prior to Encounter   Medication Sig    tamsulosin (FLOMAX) 0.4 mg Cap     amLODIPine (NORVASC) 5 MG tablet     atorvastatin (LIPITOR) 40 MG tablet Take 40 mg by mouth once daily.    b complex vitamins capsule Take 1 capsule by mouth.    colchicine (COLCRYS) 0.6 mg tablet Take 1 tablet (0.6 mg total) by mouth once daily.    cyclobenzaprine (FLEXERIL) 10 MG tablet Take 10 mg by mouth 3 (three) times daily as needed for Muscle spasms.    gabapentin (NEURONTIN) 800 MG tablet Take 800 mg by mouth 3 (three) times daily.    HYDROcodone-acetaminophen (NORCO) 5-325 mg per tablet Take 1 tablet by mouth every 6 (six) hours as needed for Pain.    megestrol (MEGACE) 40 MG Tab     pantoprazole (PROTONIX) 40 MG tablet     venlafaxine (EFFEXOR-XR) 75 MG 24 hr capsule     zolpidem (AMBIEN) 10 mg Tab      Family History       Problem Relation (Age of Onset)    Depression Father    Suicide Father          Tobacco Use    Smoking status: Every Day     Current packs/day: 0.50     Types: Cigarettes    Smokeless tobacco: Never   Substance and Sexual Activity    Alcohol use: Yes     Comment: occasion    Drug use: Not Currently    Sexual activity: Not on  file     Review of Systems   Constitutional:  Negative for appetite change, fatigue and fever.   Respiratory:  Negative for cough, shortness of breath and wheezing.    Cardiovascular:  Negative for chest pain and leg swelling.   Gastrointestinal:  Negative for abdominal distention, abdominal pain, constipation, diarrhea, nausea and vomiting.   Skin:  Negative for color change, pallor, rash and wound.   Neurological:  Negative for tremors, syncope and headaches.   Psychiatric/Behavioral:  Negative for agitation and behavioral problems.      Objective:     Vital Signs (Most Recent):  Temp: 97.1 °F (36.2 °C) (06/03/25 1615)  Pulse: 75 (06/03/25 1715)  Resp: 17 (06/03/25 1715)  BP: 108/65 (06/03/25 1715)  SpO2: (!) 90 % (06/03/25 1715) Vital Signs (24h Range):  Temp:  [97.1 °F (36.2 °C)-98.5 °F (36.9 °C)] 97.1 °F (36.2 °C)  Pulse:  [71-81] 75  Resp:  [13-29] 17  SpO2:  [90 %-97 %] 90 %  BP: ()/(56-78) 108/65     Weight: 92.5 kg (203 lb 14.8 oz)  Body mass index is 31.01 kg/m².     Physical Exam  Vitals and nursing note reviewed. Exam conducted with a chaperone present.   Constitutional:       General: He is not in acute distress.     Appearance: Normal appearance. He is normal weight. He is not ill-appearing.   HENT:      Head: Normocephalic and atraumatic.      Nose: Nose normal.   Eyes:      General: No scleral icterus.     Conjunctiva/sclera: Conjunctivae normal.   Cardiovascular:      Rate and Rhythm: Normal rate and regular rhythm.      Pulses: Normal pulses.      Heart sounds: Normal heart sounds.   Pulmonary:      Effort: Pulmonary effort is normal.      Breath sounds: Normal breath sounds.   Abdominal:      General: Abdomen is flat. Bowel sounds are normal.      Palpations: Abdomen is soft.   Musculoskeletal:      Right lower leg: No edema.      Left lower leg: No edema.   Skin:     General: Skin is warm and dry.      Findings: No erythema or rash.   Neurological:      General: No focal deficit present.     "  Mental Status: He is alert and oriented to person, place, and time.   Psychiatric:         Mood and Affect: Mood normal.         Behavior: Behavior normal.         Thought Content: Thought content normal.                Significant Labs: All pertinent labs within the past 24 hours have been reviewed.  CBC:   Recent Labs   Lab 06/03/25  1250   WBC 6.42   HGB 12.2*   HCT 36.2        CMP:   Recent Labs   Lab 06/03/25  1250      K 3.9      CO2 27   *   BUN 18   CREATININE 0.98   CALCIUM 9.2   PROT 7.3   ALBUMIN 4.3   BILITOT 0.4   ALKPHOS 128   AST 34   ALT 28     Cardiac Markers: No results for input(s): "CKMB", "MYOGLOBIN", "BNP", "TROPISTAT" in the last 48 hours.    Significant Imaging: I have reviewed all pertinent imaging results/findings within the past 24 hours.  "

## 2025-06-04 ENCOUNTER — PATIENT MESSAGE (OUTPATIENT)
Dept: INTENSIVE CARE | Facility: HOSPITAL | Age: 75
End: 2025-06-04
Payer: OTHER GOVERNMENT

## 2025-06-04 VITALS
TEMPERATURE: 98 F | HEART RATE: 92 BPM | BODY MASS INDEX: 30.91 KG/M2 | RESPIRATION RATE: 20 BRPM | OXYGEN SATURATION: 94 % | SYSTOLIC BLOOD PRESSURE: 133 MMHG | DIASTOLIC BLOOD PRESSURE: 87 MMHG | HEIGHT: 68 IN | WEIGHT: 203.94 LBS

## 2025-06-04 PROBLEM — I10 PRIMARY HYPERTENSION: Status: ACTIVE | Noted: 2025-06-04

## 2025-06-04 LAB
ALBUMIN SERPL-MCNC: 3.2 G/DL (ref 3.4–5)
ALBUMIN/GLOB SERPL: 1.3 RATIO
ALP SERPL-CCNC: 117 UNIT/L (ref 50–144)
ALT SERPL-CCNC: 20 UNIT/L (ref 1–45)
ANION GAP SERPL CALC-SCNC: 0 MEQ/L (ref 2–13)
AST SERPL-CCNC: 24 UNIT/L (ref 17–59)
BASOPHILS # BLD AUTO: 0.05 X10(3)/MCL (ref 0.01–0.08)
BASOPHILS NFR BLD AUTO: 0.9 % (ref 0.1–1.2)
BILIRUB SERPL-MCNC: 0.1 MG/DL (ref 0–1)
BUN SERPL-MCNC: 18 MG/DL (ref 7–20)
CALCIUM SERPL-MCNC: 8.3 MG/DL (ref 8.4–10.2)
CHLORIDE SERPL-SCNC: 112 MMOL/L (ref 98–110)
CO2 SERPL-SCNC: 27 MMOL/L (ref 21–32)
CREAT SERPL-MCNC: 0.99 MG/DL (ref 0.66–1.25)
CREAT/UREA NIT SERPL: 18 (ref 12–20)
EOSINOPHIL # BLD AUTO: 0.14 X10(3)/MCL (ref 0.04–0.54)
EOSINOPHIL NFR BLD AUTO: 2.5 % (ref 0.7–7)
ERYTHROCYTE [DISTWIDTH] IN BLOOD BY AUTOMATED COUNT: 15.6 %
EST. AVERAGE GLUCOSE BLD GHB EST-MCNC: 125.5 MG/DL (ref 70–115)
GFR SERPLBLD CREATININE-BSD FMLA CKD-EPI: 81 ML/MIN/1.73/M2
GLOBULIN SER-MCNC: 2.5 GM/DL (ref 2–3.9)
GLUCOSE SERPL-MCNC: 93 MG/DL (ref 70–115)
HBA1C MFR BLD: 6 % (ref 4–6)
HCT VFR BLD AUTO: 31.7 % (ref 36–52)
HGB BLD-MCNC: 10.6 G/DL (ref 13–18)
IMM GRANULOCYTES # BLD AUTO: 0.05 X10(3)/MCL (ref 0–0.03)
IMM GRANULOCYTES NFR BLD AUTO: 0.9 % (ref 0–0.5)
LYMPHOCYTES # BLD AUTO: 1.32 X10(3)/MCL (ref 1.32–3.57)
LYMPHOCYTES NFR BLD AUTO: 23.7 % (ref 20–55)
MAGNESIUM SERPL-MCNC: 1.5 MG/DL (ref 1.8–2.4)
MCH RBC QN AUTO: 30.5 PG (ref 27–34)
MCHC RBC AUTO-ENTMCNC: 33.4 G/DL (ref 31–37)
MCV RBC AUTO: 91.4 FL (ref 79–99)
MONOCYTES # BLD AUTO: 0.84 X10(3)/MCL (ref 0.3–0.82)
MONOCYTES NFR BLD AUTO: 15.1 % (ref 4.7–12.5)
NEUTROPHILS # BLD AUTO: 3.17 X10(3)/MCL (ref 1.78–5.38)
NEUTROPHILS NFR BLD AUTO: 56.9 % (ref 37–73)
NRBC BLD AUTO-RTO: 0 %
PLATELET # BLD AUTO: 181 X10(3)/MCL (ref 140–371)
PMV BLD AUTO: 9.2 FL (ref 9.4–12.4)
POCT GLUCOSE: 82 MG/DL (ref 70–110)
POCT GLUCOSE: 92 MG/DL (ref 70–110)
POTASSIUM SERPL-SCNC: 4.2 MMOL/L (ref 3.5–5.1)
PROT SERPL-MCNC: 5.7 GM/DL (ref 6.3–8.2)
RBC # BLD AUTO: 3.47 X10(6)/MCL (ref 4–6)
SODIUM SERPL-SCNC: 139 MMOL/L (ref 136–145)
TSH SERPL-ACNC: 1.24 UIU/ML (ref 0.36–3.74)
WBC # BLD AUTO: 5.57 X10(3)/MCL (ref 4–11.5)

## 2025-06-04 PROCEDURE — 63600175 PHARM REV CODE 636 W HCPCS: Performed by: OTOLARYNGOLOGY

## 2025-06-04 PROCEDURE — 83036 HEMOGLOBIN GLYCOSYLATED A1C: CPT | Performed by: FAMILY MEDICINE

## 2025-06-04 PROCEDURE — 84443 ASSAY THYROID STIM HORMONE: CPT | Performed by: FAMILY MEDICINE

## 2025-06-04 PROCEDURE — 25000003 PHARM REV CODE 250: Performed by: FAMILY MEDICINE

## 2025-06-04 PROCEDURE — 63600175 PHARM REV CODE 636 W HCPCS: Performed by: FAMILY MEDICINE

## 2025-06-04 PROCEDURE — 83735 ASSAY OF MAGNESIUM: CPT | Performed by: FAMILY MEDICINE

## 2025-06-04 PROCEDURE — 99900035 HC TECH TIME PER 15 MIN (STAT)

## 2025-06-04 PROCEDURE — 25000003 PHARM REV CODE 250: Performed by: OTOLARYNGOLOGY

## 2025-06-04 PROCEDURE — 80053 COMPREHEN METABOLIC PANEL: CPT | Performed by: FAMILY MEDICINE

## 2025-06-04 PROCEDURE — 85025 COMPLETE CBC W/AUTO DIFF WBC: CPT | Performed by: FAMILY MEDICINE

## 2025-06-04 PROCEDURE — 27000221 HC OXYGEN, UP TO 24 HOURS

## 2025-06-04 RX ORDER — PANTOPRAZOLE SODIUM 40 MG/1
40 TABLET, DELAYED RELEASE ORAL DAILY
Status: DISCONTINUED | OUTPATIENT
Start: 2025-06-04 | End: 2025-06-04 | Stop reason: HOSPADM

## 2025-06-04 RX ORDER — AMLODIPINE BESYLATE 5 MG/1
5 TABLET ORAL DAILY
Status: DISCONTINUED | OUTPATIENT
Start: 2025-06-04 | End: 2025-06-04 | Stop reason: HOSPADM

## 2025-06-04 RX ORDER — IBUPROFEN 200 MG
1 TABLET ORAL DAILY
Start: 2025-06-04

## 2025-06-04 RX ORDER — LANOLIN ALCOHOL/MO/W.PET/CERES
400 CREAM (GRAM) TOPICAL 2 TIMES DAILY
Status: DISCONTINUED | OUTPATIENT
Start: 2025-06-04 | End: 2025-06-04 | Stop reason: HOSPADM

## 2025-06-04 RX ORDER — TAMSULOSIN HYDROCHLORIDE 0.4 MG/1
0.4 CAPSULE ORAL DAILY
Status: DISCONTINUED | OUTPATIENT
Start: 2025-06-04 | End: 2025-06-04 | Stop reason: HOSPADM

## 2025-06-04 RX ORDER — CYCLOBENZAPRINE HCL 10 MG
10 TABLET ORAL 3 TIMES DAILY PRN
Status: DISCONTINUED | OUTPATIENT
Start: 2025-06-04 | End: 2025-06-04 | Stop reason: HOSPADM

## 2025-06-04 RX ORDER — GABAPENTIN 400 MG/1
800 CAPSULE ORAL 3 TIMES DAILY
Status: DISCONTINUED | OUTPATIENT
Start: 2025-06-04 | End: 2025-06-04 | Stop reason: HOSPADM

## 2025-06-04 RX ORDER — CLONIDINE HYDROCHLORIDE 0.1 MG/1
0.2 TABLET ORAL EVERY 6 HOURS PRN
Start: 2025-06-04 | End: 2026-06-04

## 2025-06-04 RX ORDER — MEGESTROL ACETATE 40 MG/1
40 TABLET ORAL 2 TIMES DAILY
Status: DISCONTINUED | OUTPATIENT
Start: 2025-06-04 | End: 2025-06-04 | Stop reason: HOSPADM

## 2025-06-04 RX ORDER — CLONIDINE HYDROCHLORIDE 0.1 MG/1
0.2 TABLET ORAL EVERY 6 HOURS PRN
Status: DISCONTINUED | OUTPATIENT
Start: 2025-06-04 | End: 2025-06-04 | Stop reason: HOSPADM

## 2025-06-04 RX ORDER — ATORVASTATIN CALCIUM 40 MG/1
40 TABLET, FILM COATED ORAL DAILY
Status: DISCONTINUED | OUTPATIENT
Start: 2025-06-04 | End: 2025-06-04 | Stop reason: HOSPADM

## 2025-06-04 RX ORDER — HYDRALAZINE HYDROCHLORIDE 20 MG/ML
10 INJECTION INTRAMUSCULAR; INTRAVENOUS EVERY 4 HOURS PRN
Status: DISCONTINUED | OUTPATIENT
Start: 2025-06-04 | End: 2025-06-04

## 2025-06-04 RX ORDER — MAGNESIUM SULFATE HEPTAHYDRATE 40 MG/ML
2 INJECTION, SOLUTION INTRAVENOUS ONCE
Status: COMPLETED | OUTPATIENT
Start: 2025-06-04 | End: 2025-06-04

## 2025-06-04 RX ORDER — VENLAFAXINE HYDROCHLORIDE 75 MG/1
75 CAPSULE, EXTENDED RELEASE ORAL DAILY
Status: DISCONTINUED | OUTPATIENT
Start: 2025-06-04 | End: 2025-06-04 | Stop reason: HOSPADM

## 2025-06-04 RX ORDER — COLCHICINE 0.6 MG/1
0.6 TABLET, FILM COATED ORAL DAILY
Status: DISCONTINUED | OUTPATIENT
Start: 2025-06-04 | End: 2025-06-04 | Stop reason: HOSPADM

## 2025-06-04 RX ORDER — ZOLPIDEM TARTRATE 5 MG/1
5 TABLET ORAL NIGHTLY PRN
Status: DISCONTINUED | OUTPATIENT
Start: 2025-06-04 | End: 2025-06-04 | Stop reason: HOSPADM

## 2025-06-04 RX ORDER — HYDROCODONE BITARTRATE AND ACETAMINOPHEN 5; 325 MG/1; MG/1
1 TABLET ORAL EVERY 6 HOURS PRN
Refills: 0 | Status: DISCONTINUED | OUTPATIENT
Start: 2025-06-04 | End: 2025-06-04 | Stop reason: HOSPADM

## 2025-06-04 RX ADMIN — AMLODIPINE BESYLATE 5 MG: 5 TABLET ORAL at 09:06

## 2025-06-04 RX ADMIN — ATORVASTATIN CALCIUM 40 MG: 40 TABLET, FILM COATED ORAL at 09:06

## 2025-06-04 RX ADMIN — SODIUM CHLORIDE: 0.9 INJECTION, SOLUTION INTRAVENOUS at 07:06

## 2025-06-04 RX ADMIN — GABAPENTIN 800 MG: 400 CAPSULE ORAL at 09:06

## 2025-06-04 RX ADMIN — VENLAFAXINE HYDROCHLORIDE 75 MG: 75 CAPSULE, EXTENDED RELEASE ORAL at 09:06

## 2025-06-04 RX ADMIN — MUPIROCIN: 20 OINTMENT TOPICAL at 09:06

## 2025-06-04 RX ADMIN — MAGNESIUM SULFATE HEPTAHYDRATE 2 G: 40 INJECTION, SOLUTION INTRAVENOUS at 06:06

## 2025-06-04 RX ADMIN — TAMSULOSIN HYDROCHLORIDE 0.4 MG: 0.4 CAPSULE ORAL at 09:06

## 2025-06-04 RX ADMIN — MEGESTROL ACETATE 40 MG: 40 TABLET ORAL at 10:06

## 2025-06-04 RX ADMIN — PANTOPRAZOLE SODIUM 40 MG: 40 TABLET, DELAYED RELEASE ORAL at 09:06

## 2025-06-04 RX ADMIN — FAMOTIDINE 20 MG: 10 INJECTION, SOLUTION INTRAVENOUS at 09:06

## 2025-06-04 RX ADMIN — GABAPENTIN 800 MG: 400 CAPSULE ORAL at 02:06

## 2025-06-04 RX ADMIN — HYDROCODONE BITARTRATE AND ACETAMINOPHEN 1 TABLET: 5; 325 TABLET ORAL at 02:06

## 2025-06-04 RX ADMIN — CLONIDINE HYDROCHLORIDE 0.2 MG: 0.1 TABLET ORAL at 02:06

## 2025-06-04 RX ADMIN — COLCHICINE 0.6 MG: 0.6 TABLET ORAL at 09:06

## 2025-06-04 NOTE — PLAN OF CARE
06/04/25 0957   Discharge Assessment   Assessment Type Discharge Planning Assessment   Confirmed/corrected address, phone number and insurance Yes   Confirmed Demographics Correct on Facesheet   Source of Information patient   Communicated HAZEL with patient/caregiver Yes   People in Home alone   Name(s) of People in Home Charlene Poe-Wife   Facility Arrived From: Home   Do you expect to return to your current living situation? Yes   Do you have help at home or someone to help you manage your care at home? Yes   Prior to hospitilization cognitive status: Alert/Oriented   Current cognitive status: Alert/Oriented   Walking or Climbing Stairs Difficulty yes   Walking or Climbing Stairs ambulation difficulty, requires equipment   Mobility Management Wheelchair   Home Accessibility wheelchair accessible   Equipment Currently Used at Home wheelchair;glucometer   Readmission within 30 days? No   Patient currently being followed by outpatient case management? No   Do you currently have service(s) that help you manage your care at home? Yes   How Many hours does patient receive services 40   Name and Contact number of agency [facility]   Is the pt/caregiver preference to resume services with current agency Yes   Do you take prescription medications? Yes   Do you have prescription coverage? Yes   Do you have any problems affording any of your prescribed medications? No   Is the patient taking medications as prescribed? yes   Who is going to help you get home at discharge? Caregiver or Wife   How do you get to doctors appointments? family or friend will provide   Are you on dialysis? No   Do you take coumadin? No   Discharge Plan A Psychiatric hospital   DME Needed Upon Discharge  none   Discharge Plan discussed with: Patient   Transition of Care Barriers Mobility;Social;Mental illness   Physical Activity   On average, how many days per week do you engage in moderate to strenuous exercise (like a brisk walk)? 0  days   On average, how many minutes do you engage in exercise at this level? 0 min

## 2025-06-04 NOTE — PLAN OF CARE
Physician ordered IP- Psych Placement as patient is PEC'D. Referral made to Central Intake @ [facility].

## 2025-06-04 NOTE — HOSPITAL COURSE
06/04/2025 pt admitted with SI and took several days of his medications 3-5 days at once.  He voiced he did not want to live.  BP was low intiitally but never required stating the levophed.  This am BP is elevated so will resume the home meds.  Mag was low and has been replaced iv and PO.  Started home meds and his BP has come down.  Will have case management start looking for placement.  Telepsyche was consulted but I do not feel they will revoke the PEC given his voicing of continued depression.    06/04/2025 DISCHARGE SUMMARY: Pt was admitted after intentionally taking about 5 days worth of his home meds, expressed to ERMD and also myself he did not want to live.  PEC was placed and he was inititally admitted to ICU for need for levophed due to low BP.  His BP was stable and he never needed to initiate the leveophed, BP has come up to his baseline HTN and we resumed his home meds this am.  He is ready and medically stable for d/c to a psyche facility and will be transferred to [facility] today.

## 2025-06-04 NOTE — DISCHARGE SUMMARY
"[facility] Hospital Medicine  Discharge Summary      Patient Name: Reggie Poe  MRN: [id number]  BEBE: [id number]  Patient Class: IP- Inpatient  Admission Date: 6/3/2025  Hospital Length of Stay: 1 days  Discharge Date and Time: No discharge date for patient encounter.  Attending Physician: Elisha Dailey MD   Discharging Provider: Elisha Dailey MD  Primary Care Provider: Nory Jackman MD    Primary Care Team: Networked reference to record PCT     HPI:     Ingestion        Pt BIBA unit 47, pt was suicidal with intent after visit from  wife, took 4 days worth of prescription medicine at 11am today, pt states he "wants to take the big sleep". Denies HI, -AH/VH. Medications brought with patient for identification. Pt in NAD and VSS at this time. .      THE PATIENT IS A PARAPLEGIC  MALE WHO IS MORE DEPRESSED THEN NORMAL AND TOOK ALL OF HIS MEDICINE FOR A 5 DAY SUPPLY AT ONE TIME TO TRY TO COMMIT SUICIDE AND "TAKE THE BIG SLEEP". HE HAS NEVER TRIED TO COMMIT SUICIDE BEFORE. HE IS HAVING ISSUES WITH HIS WIFE AND THEY ARE . HE HAS A DAILY SITTER THAT COMES TO HIS HOUSE. HE HAS A HISTORY OF DEPRESSION. HE DENIES DRUGS. HE DRINKS WHISKEY ON WEEKENDS. HE DENIES AUDITORY OR VISUAL HALLUCINATIONS. HE WAS TRANSPORTED HERE BY AMBULANCE. POISON CONTROL CONTACTED AND RECOMMENDATIONS WERE MADE BY POISON CONTROL. THE PATIENT IS IN NO ACUTE DISTRESS, VSS. HE IS AWAKE AND ALERT AND ORIENTED X 3, GCS 15, COOPERATIVE WITH EXAM AND QUESTIONING.     THE PATIENT IS WHEELCHAIR BOUND. HE STATES HE HAD A BENIGN BRAIN TUMOR REMOVED 6 YEARS AGO AND IS NOW PARALYZED AND HAS EXPRESSIVE APHASIA SINCE THE SURGERY.     MEDICINE HE TOOK INCLUDES PROZAC, FLOMAX, GABAPENTIN, EFFEXOR, GEODON, BUSPAR, CELEBREX, ATORVASTATIN, METFORMIN, LISINOPRIL, COREG, ZYRTEC, PRIMIDONE, LASIX AND PROSCAR. POISON CONTROL CONTACTED AND RECOMMENDATIONS GIVEN INCLUDING ACTIVATED CHARCOAL AND IVF BOLUS.    In ER " "pt BP was low, given mutliple ivf boluses, however remained low, planned to start on levophed and will admit to ICU for pressure support and monitoring.  He received ivf and charcoal in the ER per poison control recommendations.  On arrival to ICU he is awake and alert. HE says his ex wife was putting him on a budget and handling all his business, he has been depressed and "didn't want to live like that"  He is admitted from ER with PEC, BP has not been low and levophed never had to be started.    BP Readings from Last 3 Encounters:   06/03/25 109/67   01/31/25 133/77   07/29/24 (!) 145/83                  * No surgery found *      Hospital Course:   06/04/2025 pt admitted with SI and took several days of his medications 3-5 days at once.  He voiced he did not want to live.  BP was low intiitally but never required stating the levophed.  This am BP is elevated so will resume the home meds.  Mag was low and has been replaced iv and PO.  Started home meds and his BP has come down.  Will have case management start looking for placement.  Telepsyche was consulted but I do not feel they will revoke the PEC given his voicing of continued depression.    06/04/2025 DISCHARGE SUMMARY: Pt was admitted after intentionally taking about 5 days worth of his home meds, expressed to ERMD and also myself he did not want to live.  PEC was placed and he was inititally admitted to ICU for need for levophed due to low BP.  His BP was stable and he never needed to initiate the leveophed, BP has come up to his baseline HTN and we resumed his home meds this am.  He is ready and medically stable for d/c to a psyche facility and will be transferred to [facility] today.     Goals of Care Treatment Preferences:  Code Status: Full Code      SDOH Screening:  The patient declined to be screened for utility difficulties, food insecurity, transport difficulties, housing insecurity, and interpersonal safety, so no concerns could be identified this " admission.     Consults:   Consults (From admission, onward)          Status Ordering Provider     Inpatient consult to Telemedicine - Psyc  Once        Provider:  (Not yet assigned)    Acknowledged ALYSON GILMORE            Assessment & Plan  Hypotension due to drugs  resolved    Overdose  Multiple medications overdosed   Thinks he took 3-5 days of medications   PROZAC, FLOMAX, GABAPENTIN, EFFEXOR, GEODON, BUSPAR, CELEBREX, ATORVASTATIN, METFORMIN, LISINOPRIL, COREG, ZYRTEC, PRIMIDONE, LASIX AND PROSCAR.  Continue PEC  Telepsyche consulted  Once medically stable and BP stabilizxed will look for psyche placment.  Medical clearance for psychiatric admission  No longer needs ICU fro BP  Start back on home meds for BP  Still in ICU for 1:1 Due to PEC    Suicidal ideation  PEC in place   1:1    Suicidal behavior with attempted self-injury  1:1 sitter in place    Expressive aphasia syndrome  Cheonic     Paraplegic cerebral palsy  chronic    Primary hypertension  Patient's blood pressure range in the last 24 hours was: BP  Min: 87/59  Max: 165/93.The patient's inpatient anti-hypertensive regimen is listed below:  Current Antihypertensives  cloNIDine tablet 0.2 mg, Every 6 hours PRN, Oral  amLODIPine tablet 5 mg, Daily, Oral  cloNIDine (CATAPRES) tablet, Every 6 hours PRN, Oral    Plan  - BP is uncontrolled, will adjust as follows: resume home meds this am, prn clonidine if needed  -    Final Active Diagnoses:    Diagnosis Date Noted POA    PRINCIPAL PROBLEM:  Hypotension due to drugs [I95.2] 06/03/2025 Yes    Primary hypertension [I10] 06/04/2025 Yes    Overdose [T50.901A] 06/03/2025 Yes    Medical clearance for psychiatric admission [Z00.8] 06/03/2025 Not Applicable    Suicidal ideation [R45.851] 06/03/2025 Not Applicable    Suicidal behavior with attempted self-injury [T14.91XA] 06/03/2025 Yes    Expressive aphasia syndrome [R47.01] 06/03/2025 Yes    Paraplegic cerebral palsy [G80.8] 06/03/2025 Yes      Problems  Resolved During this Admission:       Discharged Condition: good    Disposition: Psychiatric Hospital    Follow Up:   Contact information for follow-up providers       Nory Jackman MD Follow up.    Specialty: Internal Medicine  Contact information:  [address] [place] [id number]  [phone]                       Contact information for after-discharge care       Destination       [facility] Central Intake .    Service: Mental Health Hospital Treatment  Contact information:  [phone]  Additional information:  Central Intake - not used for placement.                                 Patient Instructions:      Ambulatory referral/consult to Smoking Cessation Program   Standing Status: Future   Referral Priority: Routine Referral Type: Consultation   Referral Reason: Specialty Services Required   Requested Specialty: CTTS   Number of Visits Requested: 1     Activity as tolerated       Significant Diagnostic Studies: Labs: CMP   Recent Labs   Lab 06/03/25  1250 06/04/25  0422    139   K 3.9 4.2    112*   CO2 27 27   * 93   BUN 18 18   CREATININE 0.98 0.99   CALCIUM 9.2 8.3*   PROT 7.3 5.7*   ALBUMIN 4.3 3.2*   BILITOT 0.4 0.1   ALKPHOS 128 117   AST 34 24   ALT 28 20    and CBC   Recent Labs   Lab 06/03/25  1250 06/04/25  0422   WBC 6.42 5.57   HGB 12.2* 10.6*   HCT 36.2 31.7*    181       Pending Diagnostic Studies:       None           Medications:  Reconciled Home Medications:      Medication List        START taking these medications      cloNIDine 0.1 MG tablet  Commonly known as: CATAPRES  Take 2 tablets (0.2 mg total) by mouth every 6 (six) hours as needed (150 and/or 90).     nicotine 21 mg/24 hr  Commonly known as: NICODERM CQ  Place 1 patch onto the skin once daily.            CONTINUE taking these medications      amLODIPine 5 MG tablet  Commonly known as: NORVASC     atorvastatin 40 MG tablet  Commonly known as: LIPITOR  Take 40 mg by mouth once  daily.     b complex vitamins capsule  Take 1 capsule by mouth.     colchicine 0.6 mg tablet  Commonly known as: COLCRYS  Take 1 tablet (0.6 mg total) by mouth once daily.     cyclobenzaprine 10 MG tablet  Commonly known as: FLEXERIL  Take 10 mg by mouth 3 (three) times daily as needed for Muscle spasms.     gabapentin 800 MG tablet  Commonly known as: NEURONTIN  Take 800 mg by mouth 3 (three) times daily.     HYDROcodone-acetaminophen 5-325 mg per tablet  Commonly known as: NORCO  Take 1 tablet by mouth every 6 (six) hours as needed for Pain.     megestroL 40 MG Tab  Commonly known as: MEGACE     pantoprazole 40 MG tablet  Commonly known as: PROTONIX     tamsulosin 0.4 mg Cap  Commonly known as: FLOMAX     venlafaxine 75 MG 24 hr capsule  Commonly known as: EFFEXOR-XR     zolpidem 10 mg Tab  Commonly known as: AMBIEN              Indwelling Lines/Drains at time of discharge:   Lines/Drains/Airways       Drain  Duration             Male External Urinary Catheter 06/03/25 1700 <1 day                    Time spent on the discharge of patient: 36 minutes     Patient Screened for food insecurity, housing instability, transportation needs, utility difficulties, and interpersonal safety.  No needs identified    Physical Exam  Vitals and nursing note reviewed.   Constitutional:       General: He is not in acute distress.     Appearance: Normal appearance. He is normal weight. He is not ill-appearing.   HENT:      Head: Normocephalic and atraumatic.   Cardiovascular:      Rate and Rhythm: Normal rate and regular rhythm.      Pulses: Normal pulses.      Heart sounds: Normal heart sounds.   Pulmonary:      Effort: Pulmonary effort is normal.      Breath sounds: Normal breath sounds.   Abdominal:      General: Abdomen is flat. Bowel sounds are normal.      Palpations: Abdomen is soft.   Skin:     General: Skin is warm and dry.      Findings: No erythema or rash.   Neurological:      Mental Status: He is alert.   Psychiatric:       Comments: I had a face to face encounter with this patient prior to discharging           Elisha Dailey MD  Department of Hospital Medicine  [facility]

## 2025-06-04 NOTE — PLAN OF CARE
06/04/25 1347   Final Note   Assessment Type Final Discharge Note   Anticipated Discharge Disposition Psych  ([facility] in [place])   What phone number can be called within the next 1-3 days to see how you are doing after discharge? [phone]   Post-Acute Status   Post-Acute Authorization Placement   Post-Acute Placement Status Set-up Complete/Auth obtained   Discharge Delays (!) Ambulance Transport/Facility Transport

## 2025-06-04 NOTE — PLAN OF CARE
Problem: Violence Risk or Actual  Goal: Anger and Impulse Control  Outcome: Progressing     Problem: Suicide Risk  Goal: Absence of Self-Harm  Outcome: Progressing     Problem: Adult Inpatient Plan of Care  Goal: Plan of Care Review  Outcome: Progressing  Goal: Patient-Specific Goal (Individualized)  Outcome: Progressing  Goal: Absence of Hospital-Acquired Illness or Injury  Outcome: Progressing  Goal: Optimal Comfort and Wellbeing  Outcome: Progressing  Goal: Readiness for Transition of Care  Outcome: Progressing     Problem: Fall Injury Risk  Goal: Absence of Fall and Fall-Related Injury  Outcome: Progressing     Problem: Skin Injury Risk Increased  Goal: Skin Health and Integrity  Outcome: Progressing

## 2025-06-04 NOTE — NURSING
2911  Phone call from Nehemias with [facility] intake stating that patient has been accepted at [facility] facility at [facility]. Phone number for report given [phone].

## 2025-06-04 NOTE — ASSESSMENT & PLAN NOTE
Multiple medications overdosed   Thinks he took 3-5 days of medications   PROZAC, FLOMAX, GABAPENTIN, EFFEXOR, GEODON, BUSPAR, CELEBREX, ATORVASTATIN, METFORMIN, LISINOPRIL, COREG, ZYRTEC, PRIMIDONE, LASIX AND PROSCAR.  Continue PEC  Telepsyche consulted  Once medically stable and BP stabilizxed will look for psyche placment.

## 2025-06-04 NOTE — PLAN OF CARE
Patient accepted for inpt psych by Dr. Garduno @ [facility] ,  [address] [place] [id number].

## 2025-06-04 NOTE — ASSESSMENT & PLAN NOTE
Patient's blood pressure range in the last 24 hours was: BP  Min: 82/56  Max: 165/93.The patient's inpatient anti-hypertensive regimen is listed below:  Current Antihypertensives  cloNIDine tablet 0.2 mg, Every 6 hours PRN, Oral  amLODIPine tablet 5 mg, Daily, Oral    Plan  - BP is uncontrolled, will adjust as follows: resume home meds this am, prn clonidine if needed  -

## 2025-06-04 NOTE — ASSESSMENT & PLAN NOTE
Patient's blood pressure range in the last 24 hours was: BP  Min: 87/59  Max: 165/93.The patient's inpatient anti-hypertensive regimen is listed below:  Current Antihypertensives  cloNIDine tablet 0.2 mg, Every 6 hours PRN, Oral  amLODIPine tablet 5 mg, Daily, Oral  cloNIDine (CATAPRES) tablet, Every 6 hours PRN, Oral    Plan  - BP is uncontrolled, will adjust as follows: resume home meds this am, prn clonidine if needed  -

## 2025-06-04 NOTE — PROGRESS NOTES
"[facility] Medicine  Progress Note    Patient Name: Reggie Poe  MRN: [id number]  Patient Class: IP- Inpatient   Admission Date: 6/3/2025  Length of Stay: 1 days  Attending Physician: Elisha Dailey MD  Primary Care Provider: Nory Jackman MD        Subjective     Principal Problem:Hypotension due to drugs        HPI:    Ingestion        Pt BIBA unit 47, pt was suicidal with intent after visit from  wife, took 4 days worth of prescription medicine at 11am today, pt states he "wants to take the big sleep". Denies HI, -AH/VH. Medications brought with patient for identification. Pt in NAD and VSS at this time. .      THE PATIENT IS A PARAPLEGIC  MALE WHO IS MORE DEPRESSED THEN NORMAL AND TOOK ALL OF HIS MEDICINE FOR A 5 DAY SUPPLY AT ONE TIME TO TRY TO COMMIT SUICIDE AND "TAKE THE BIG SLEEP". HE HAS NEVER TRIED TO COMMIT SUICIDE BEFORE. HE IS HAVING ISSUES WITH HIS WIFE AND THEY ARE . HE HAS A DAILY SITTER THAT COMES TO HIS HOUSE. HE HAS A HISTORY OF DEPRESSION. HE DENIES DRUGS. HE DRINKS WHISKEY ON WEEKENDS. HE DENIES AUDITORY OR VISUAL HALLUCINATIONS. HE WAS TRANSPORTED HERE BY AMBULANCE. POISON CONTROL CONTACTED AND RECOMMENDATIONS WERE MADE BY POISON CONTROL. THE PATIENT IS IN NO ACUTE DISTRESS, VSS. HE IS AWAKE AND ALERT AND ORIENTED X 3, GCS 15, COOPERATIVE WITH EXAM AND QUESTIONING.     THE PATIENT IS WHEELCHAIR BOUND. HE STATES HE HAD A BENIGN BRAIN TUMOR REMOVED 6 YEARS AGO AND IS NOW PARALYZED AND HAS EXPRESSIVE APHASIA SINCE THE SURGERY.     MEDICINE HE TOOK INCLUDES PROZAC, FLOMAX, GABAPENTIN, EFFEXOR, GEODON, BUSPAR, CELEBREX, ATORVASTATIN, METFORMIN, LISINOPRIL, COREG, ZYRTEC, PRIMIDONE, LASIX AND PROSCAR. POISON CONTROL CONTACTED AND RECOMMENDATIONS GIVEN INCLUDING ACTIVATED CHARCOAL AND IVF BOLUS.    In ER pt BP was low, given mutliple ivf boluses, however remained low, planned to start on levophed and will admit to ICU for pressure " "support and monitoring.  He received ivf and charcoal in the ER per poison control recommendations.  On arrival to ICU he is awake and alert. HE says his ex wife was putting him on a budget and handling all his business, he has been depressed and "didn't want to live like that"  He is admitted from ER with PEC, BP has not been low and levophed never had to be started.    BP Readings from Last 3 Encounters:   06/03/25 109/67   01/31/25 133/77   07/29/24 (!) 145/83                  Overview/Hospital Course:  06/04/2025 pt admitted with SI and took several days of his medications 3-5 days at once.  He voiced he did not want to live.  BP was low intiitally but never required stating the levophed.  This am BP is elevated so will resume the home meds.  Mag was low and has been replaced iv and PO.  Started home meds and his BP has come down.  Will have case management start looking for placement.  Telepsyche was consulted but I do not feel they will revoke the PEC given his voicing of continued depression.      Past Medical History:   Diagnosis Date    Anxiety disorder, unspecified     Cancer     Diabetes mellitus     Hypertension     Major depressive disorder, single episode, unspecified     Mental disorder     Mixed hyperlipidemia        Past Surgical History:   Procedure Laterality Date    APPENDECTOMY      EYE SURGERY      FRACTURE SURGERY      HIP REPLACEMENT ARTHROPLASTY Left     SUBOCCIPITAL CRANIOTOMY Right 26 Jun 2019    Right suboccipital craniotomy for resection of ridht posterior fossa mass on 26 June 2019 /c Dr Thomas    TOTAL SHOULDER ARTHROPLASTY Right        Review of patient's allergies indicates:   Allergen Reactions    Hydralazine analogues     Nifedipine        No current facility-administered medications on file prior to encounter.     Current Outpatient Medications on File Prior to Encounter   Medication Sig    tamsulosin (FLOMAX) 0.4 mg Cap     amLODIPine (NORVASC) 5 MG tablet     atorvastatin " (LIPITOR) 40 MG tablet Take 40 mg by mouth once daily.    b complex vitamins capsule Take 1 capsule by mouth.    colchicine (COLCRYS) 0.6 mg tablet Take 1 tablet (0.6 mg total) by mouth once daily.    cyclobenzaprine (FLEXERIL) 10 MG tablet Take 10 mg by mouth 3 (three) times daily as needed for Muscle spasms.    gabapentin (NEURONTIN) 800 MG tablet Take 800 mg by mouth 3 (three) times daily.    HYDROcodone-acetaminophen (NORCO) 5-325 mg per tablet Take 1 tablet by mouth every 6 (six) hours as needed for Pain.    megestrol (MEGACE) 40 MG Tab     pantoprazole (PROTONIX) 40 MG tablet     venlafaxine (EFFEXOR-XR) 75 MG 24 hr capsule     zolpidem (AMBIEN) 10 mg Tab      Family History       Problem Relation (Age of Onset)    Depression Father    Suicide Father          Tobacco Use    Smoking status: Every Day     Current packs/day: 0.50     Types: Cigarettes    Smokeless tobacco: Never   Substance and Sexual Activity    Alcohol use: Yes     Comment: occasion    Drug use: Not Currently    Sexual activity: Not on file     Review of Systems   Constitutional:  Negative for appetite change, fatigue and fever.   Respiratory:  Negative for cough, shortness of breath and wheezing.    Cardiovascular:  Negative for chest pain and leg swelling.   Gastrointestinal:  Negative for abdominal distention, abdominal pain, constipation, diarrhea, nausea and vomiting.   Skin:  Negative for color change, pallor, rash and wound.   Neurological:  Negative for tremors, syncope and headaches.   Psychiatric/Behavioral:  Positive for dysphoric mood, self-injury and suicidal ideas. Negative for agitation and behavioral problems.      Objective:     Vital Signs (Most Recent):  Temp: 97.9 °F (36.6 °C) (06/04/25 0705)  Pulse: 81 (06/04/25 1000)  Resp: 17 (06/04/25 1000)  BP: (!) 165/93 (06/04/25 1000)  SpO2: 99 % (06/04/25 1000) Vital Signs (24h Range):  Temp:  [97.1 °F (36.2 °C)-98.5 °F (36.9 °C)] 97.9 °F (36.6 °C)  Pulse:  [67-85] 81  Resp:   "[11-29] 17  SpO2:  [90 %-99 %] 99 %  BP: ()/() 165/93     Weight: 92.5 kg (203 lb 14.8 oz)  Body mass index is 31.01 kg/m².     Physical Exam  Vitals and nursing note reviewed. Exam conducted with a chaperone present.   Constitutional:       General: He is not in acute distress.     Appearance: Normal appearance. He is normal weight. He is not ill-appearing.   HENT:      Head: Normocephalic and atraumatic.      Nose: Nose normal.   Eyes:      General: No scleral icterus.     Conjunctiva/sclera: Conjunctivae normal.   Cardiovascular:      Rate and Rhythm: Normal rate and regular rhythm.      Pulses: Normal pulses.      Heart sounds: Normal heart sounds.   Pulmonary:      Effort: Pulmonary effort is normal.      Breath sounds: Normal breath sounds.   Abdominal:      General: Abdomen is flat. Bowel sounds are normal.      Palpations: Abdomen is soft.   Musculoskeletal:      Right lower leg: No edema.      Left lower leg: No edema.   Skin:     General: Skin is warm and dry.      Findings: No erythema or rash.   Neurological:      General: No focal deficit present.      Mental Status: He is alert and oriented to person, place, and time.   Psychiatric:         Mood and Affect: Mood normal.         Behavior: Behavior normal.         Thought Content: Thought content normal.                Significant Labs: All pertinent labs within the past 24 hours have been reviewed.  CBC:   Recent Labs   Lab 06/03/25  1250 06/04/25  0422   WBC 6.42 5.57   HGB 12.2* 10.6*   HCT 36.2 31.7*    181     CMP:   Recent Labs   Lab 06/03/25  1250 06/04/25  0422    139   K 3.9 4.2    112*   CO2 27 27   * 93   BUN 18 18   CREATININE 0.98 0.99   CALCIUM 9.2 8.3*   PROT 7.3 5.7*   ALBUMIN 4.3 3.2*   BILITOT 0.4 0.1   ALKPHOS 128 117   AST 34 24   ALT 28 20     Cardiac Markers: No results for input(s): "CKMB", "MYOGLOBIN", "BNP", "TROPISTAT" in the last 48 hours.    Significant Imaging: I have reviewed all " pertinent imaging results/findings within the past 24 hours.      Assessment & Plan  Hypotension due to drugs  resolved    Overdose  Multiple medications overdosed   Thinks he took 3-5 days of medications   PROZAC, FLOMAX, GABAPENTIN, EFFEXOR, GEODON, BUSPAR, CELEBREX, ATORVASTATIN, METFORMIN, LISINOPRIL, COREG, ZYRTEC, PRIMIDONE, LASIX AND PROSCAR.  Continue PEC  Telepsyche consulted  Once medically stable and BP stabilizxed will look for psyche placment.  Medical clearance for psychiatric admission  No longer needs ICU fro BP  Start back on home meds for BP  Still in ICU for 1:1 Due to PEC    Suicidal ideation  PEC in place   1:1    Suicidal behavior with attempted self-injury  1:1 sitter in place    Expressive aphasia syndrome  Cheonic     Paraplegic cerebral palsy  chronic    Primary hypertension  Patient's blood pressure range in the last 24 hours was: BP  Min: 82/56  Max: 165/93.The patient's inpatient anti-hypertensive regimen is listed below:  Current Antihypertensives  cloNIDine tablet 0.2 mg, Every 6 hours PRN, Oral  amLODIPine tablet 5 mg, Daily, Oral    Plan  - BP is uncontrolled, will adjust as follows: resume home meds this am, prn clonidine if needed  -    VTE Risk Mitigation (From admission, onward)           Ordered     enoxaparin injection 40 mg  Every 24 hours         06/03/25 1750     IP VTE HIGH RISK PATIENT  Once         06/03/25 1520                    Discharge Planning   HAZEL: 6/4/2025     Code Status: Full Code   Medical Readiness for Discharge Date:                            Elisha Dailey MD  Department of Hospital Medicine   [facility]

## 2025-06-04 NOTE — NURSING
[facility]. Informed there will be a 2 hour delay

## 2025-06-04 NOTE — SUBJECTIVE & OBJECTIVE
Past Medical History:   Diagnosis Date    Anxiety disorder, unspecified     Cancer     Diabetes mellitus     Hypertension     Major depressive disorder, single episode, unspecified     Mental disorder     Mixed hyperlipidemia        Past Surgical History:   Procedure Laterality Date    APPENDECTOMY      EYE SURGERY      FRACTURE SURGERY      HIP REPLACEMENT ARTHROPLASTY Left     SUBOCCIPITAL CRANIOTOMY Right 26 Jun 2019    Right suboccipital craniotomy for resection of ridht posterior fossa mass on 26 June 2019 /c Dr Thomas    TOTAL SHOULDER ARTHROPLASTY Right        Review of patient's allergies indicates:   Allergen Reactions    Hydralazine analogues     Nifedipine        No current facility-administered medications on file prior to encounter.     Current Outpatient Medications on File Prior to Encounter   Medication Sig    tamsulosin (FLOMAX) 0.4 mg Cap     amLODIPine (NORVASC) 5 MG tablet     atorvastatin (LIPITOR) 40 MG tablet Take 40 mg by mouth once daily.    b complex vitamins capsule Take 1 capsule by mouth.    colchicine (COLCRYS) 0.6 mg tablet Take 1 tablet (0.6 mg total) by mouth once daily.    cyclobenzaprine (FLEXERIL) 10 MG tablet Take 10 mg by mouth 3 (three) times daily as needed for Muscle spasms.    gabapentin (NEURONTIN) 800 MG tablet Take 800 mg by mouth 3 (three) times daily.    HYDROcodone-acetaminophen (NORCO) 5-325 mg per tablet Take 1 tablet by mouth every 6 (six) hours as needed for Pain.    megestrol (MEGACE) 40 MG Tab     pantoprazole (PROTONIX) 40 MG tablet     venlafaxine (EFFEXOR-XR) 75 MG 24 hr capsule     zolpidem (AMBIEN) 10 mg Tab      Family History       Problem Relation (Age of Onset)    Depression Father    Suicide Father          Tobacco Use    Smoking status: Every Day     Current packs/day: 0.50     Types: Cigarettes    Smokeless tobacco: Never   Substance and Sexual Activity    Alcohol use: Yes     Comment: occasion    Drug use: Not Currently    Sexual activity: Not on  Sedation, oxygen delivery, and monitoring per anesthesia for endoscopic procedure.     file     Review of Systems   Constitutional:  Negative for appetite change, fatigue and fever.   Respiratory:  Negative for cough, shortness of breath and wheezing.    Cardiovascular:  Negative for chest pain and leg swelling.   Gastrointestinal:  Negative for abdominal distention, abdominal pain, constipation, diarrhea, nausea and vomiting.   Skin:  Negative for color change, pallor, rash and wound.   Neurological:  Negative for tremors, syncope and headaches.   Psychiatric/Behavioral:  Positive for dysphoric mood, self-injury and suicidal ideas. Negative for agitation and behavioral problems.      Objective:     Vital Signs (Most Recent):  Temp: 97.9 °F (36.6 °C) (06/04/25 0705)  Pulse: 81 (06/04/25 1000)  Resp: 17 (06/04/25 1000)  BP: (!) 165/93 (06/04/25 1000)  SpO2: 99 % (06/04/25 1000) Vital Signs (24h Range):  Temp:  [97.1 °F (36.2 °C)-98.5 °F (36.9 °C)] 97.9 °F (36.6 °C)  Pulse:  [67-85] 81  Resp:  [11-29] 17  SpO2:  [90 %-99 %] 99 %  BP: ()/() 165/93     Weight: 92.5 kg (203 lb 14.8 oz)  Body mass index is 31.01 kg/m².     Physical Exam  Vitals and nursing note reviewed. Exam conducted with a chaperone present.   Constitutional:       General: He is not in acute distress.     Appearance: Normal appearance. He is normal weight. He is not ill-appearing.   HENT:      Head: Normocephalic and atraumatic.      Nose: Nose normal.   Eyes:      General: No scleral icterus.     Conjunctiva/sclera: Conjunctivae normal.   Cardiovascular:      Rate and Rhythm: Normal rate and regular rhythm.      Pulses: Normal pulses.      Heart sounds: Normal heart sounds.   Pulmonary:      Effort: Pulmonary effort is normal.      Breath sounds: Normal breath sounds.   Abdominal:      General: Abdomen is flat. Bowel sounds are normal.      Palpations: Abdomen is soft.   Musculoskeletal:      Right lower leg: No edema.      Left lower leg: No edema.   Skin:     General: Skin is warm and dry.      Findings: No erythema or  "rash.   Neurological:      General: No focal deficit present.      Mental Status: He is alert and oriented to person, place, and time.   Psychiatric:         Mood and Affect: Mood normal.         Behavior: Behavior normal.         Thought Content: Thought content normal.                Significant Labs: All pertinent labs within the past 24 hours have been reviewed.  CBC:   Recent Labs   Lab 06/03/25  1250 06/04/25  0422   WBC 6.42 5.57   HGB 12.2* 10.6*   HCT 36.2 31.7*    181     CMP:   Recent Labs   Lab 06/03/25  1250 06/04/25  0422    139   K 3.9 4.2    112*   CO2 27 27   * 93   BUN 18 18   CREATININE 0.98 0.99   CALCIUM 9.2 8.3*   PROT 7.3 5.7*   ALBUMIN 4.3 3.2*   BILITOT 0.4 0.1   ALKPHOS 128 117   AST 34 24   ALT 28 20     Cardiac Markers: No results for input(s): "CKMB", "MYOGLOBIN", "BNP", "TROPISTAT" in the last 48 hours.    Significant Imaging: I have reviewed all pertinent imaging results/findings within the past 24 hours.  "

## 2025-06-05 LAB
OHS QRS DURATION: 92 MS
OHS QTC CALCULATION: 438 MS